# Patient Record
Sex: FEMALE | NOT HISPANIC OR LATINO | Employment: OTHER | URBAN - METROPOLITAN AREA
[De-identification: names, ages, dates, MRNs, and addresses within clinical notes are randomized per-mention and may not be internally consistent; named-entity substitution may affect disease eponyms.]

---

## 2017-01-16 ENCOUNTER — COMMUNICATION - HEALTHEAST (OUTPATIENT)
Dept: CARDIOLOGY | Facility: CLINIC | Age: 47
End: 2017-01-16

## 2017-01-16 DIAGNOSIS — R00.0 TACHYCARDIA: ICD-10-CM

## 2017-01-20 ENCOUNTER — OFFICE VISIT - HEALTHEAST (OUTPATIENT)
Dept: CARDIOLOGY | Facility: CLINIC | Age: 47
End: 2017-01-20

## 2017-01-20 ENCOUNTER — AMBULATORY - HEALTHEAST (OUTPATIENT)
Dept: CARDIOLOGY | Facility: CLINIC | Age: 47
End: 2017-01-20

## 2017-01-20 DIAGNOSIS — R00.2 HEART PALPITATIONS: ICD-10-CM

## 2017-01-20 DIAGNOSIS — I10 ESSENTIAL HYPERTENSION WITH GOAL BLOOD PRESSURE LESS THAN 130/85: ICD-10-CM

## 2017-01-20 ASSESSMENT — MIFFLIN-ST. JEOR: SCORE: 1327.35

## 2017-02-07 ENCOUNTER — COMMUNICATION - HEALTHEAST (OUTPATIENT)
Dept: CARDIOLOGY | Facility: CLINIC | Age: 47
End: 2017-02-07

## 2017-02-15 ENCOUNTER — COMMUNICATION - HEALTHEAST (OUTPATIENT)
Dept: CARDIOLOGY | Facility: CLINIC | Age: 47
End: 2017-02-15

## 2017-02-21 ENCOUNTER — COMMUNICATION - HEALTHEAST (OUTPATIENT)
Dept: CARDIOLOGY | Facility: CLINIC | Age: 47
End: 2017-02-21

## 2017-02-21 DIAGNOSIS — R07.9 CHEST PAIN: ICD-10-CM

## 2017-02-21 DIAGNOSIS — R00.2 PALPITATIONS: ICD-10-CM

## 2017-02-28 ENCOUNTER — HOSPITAL ENCOUNTER (OUTPATIENT)
Dept: CARDIOLOGY | Facility: HOSPITAL | Age: 47
Discharge: HOME | End: 2017-02-28
Attending: INTERNAL MEDICINE

## 2017-02-28 DIAGNOSIS — R00.2 PALPITATIONS: ICD-10-CM

## 2017-02-28 DIAGNOSIS — R07.9 CHEST PAIN: ICD-10-CM

## 2017-03-14 ENCOUNTER — COMMUNICATION - HEALTHEAST (OUTPATIENT)
Dept: CARDIOLOGY | Facility: CLINIC | Age: 47
End: 2017-03-14

## 2017-03-15 ENCOUNTER — AMBULATORY - HEALTHEAST (OUTPATIENT)
Dept: CARDIOLOGY | Facility: CLINIC | Age: 47
End: 2017-03-15

## 2017-03-15 ENCOUNTER — COMMUNICATION - HEALTHEAST (OUTPATIENT)
Dept: CARDIOLOGY | Facility: CLINIC | Age: 47
End: 2017-03-15

## 2017-03-15 DIAGNOSIS — R07.9 CHEST PAIN: ICD-10-CM

## 2017-03-21 ENCOUNTER — AMBULATORY - HEALTHEAST (OUTPATIENT)
Dept: CARDIOLOGY | Facility: CLINIC | Age: 47
End: 2017-03-21

## 2017-03-23 ENCOUNTER — HOSPITAL ENCOUNTER (OUTPATIENT)
Dept: CARDIOLOGY | Facility: HOSPITAL | Age: 47
Discharge: HOME | End: 2017-03-23
Attending: INTERNAL MEDICINE

## 2017-03-23 DIAGNOSIS — R07.9 CHEST PAIN: ICD-10-CM

## 2017-03-24 LAB
CV STRESS CURRENT BP HE: NORMAL
CV STRESS CURRENT HR HE: 109
CV STRESS CURRENT HR HE: 110
CV STRESS CURRENT HR HE: 111
CV STRESS CURRENT HR HE: 113
CV STRESS CURRENT HR HE: 113
CV STRESS CURRENT HR HE: 114
CV STRESS CURRENT HR HE: 115
CV STRESS CURRENT HR HE: 116
CV STRESS CURRENT HR HE: 117
CV STRESS CURRENT HR HE: 139
CV STRESS CURRENT HR HE: 140
CV STRESS CURRENT HR HE: 147
CV STRESS CURRENT HR HE: 154
CV STRESS CURRENT HR HE: 159
CV STRESS CURRENT HR HE: 76
CV STRESS CURRENT HR HE: 81
CV STRESS CURRENT HR HE: 81
CV STRESS CURRENT HR HE: 82
CV STRESS CURRENT HR HE: 84
CV STRESS CURRENT HR HE: 86
CV STRESS CURRENT HR HE: 87
CV STRESS CURRENT HR HE: 87
CV STRESS CURRENT HR HE: 93
CV STRESS CURRENT HR HE: 97
CV STRESS CURRENT HR HE: 98
CV STRESS CURRENT HR HE: 99
CV STRESS CURRENT HR HE: 99
CV STRESS DEVIATION TIME HE: NORMAL
CV STRESS ECHO PERCENT HR HE: NORMAL
CV STRESS EXERCISE STAGE HE: NORMAL
CV STRESS FINAL RESTING BP HE: NORMAL
CV STRESS FINAL RESTING HR HE: 84
CV STRESS MAX HR HE: 159
CV STRESS MAX TREADMILL GRADE HE: 16
CV STRESS MAX TREADMILL SPEED HE: 4.2
CV STRESS PEAK DIA BP HE: NORMAL
CV STRESS PEAK SYS BP HE: NORMAL
CV STRESS PHASE HE: NORMAL
CV STRESS PROTOCOL HE: NORMAL
CV STRESS RESTING PT POSITION HE: NORMAL
CV STRESS RESTING PT POSITION HE: NORMAL
CV STRESS ST DEVIATION AMOUNT HE: NORMAL
CV STRESS ST DEVIATION ELEVATION HE: NORMAL
CV STRESS ST EVELATION AMOUNT HE: NORMAL
CV STRESS TEST TYPE HE: NORMAL
CV STRESS TOTAL STAGE TIME MIN 1 HE: NORMAL
STRESS ECHO BASELINE BP: NORMAL
STRESS ECHO BASELINE HR: 76
STRESS ECHO CALCULATED PERCENT HR: 91 %
STRESS ECHO LAST STRESS BP: NORMAL
STRESS ECHO LAST STRESS HR: 154
STRESS ECHO POST ESTIMATED WORKLOAD: 11.3
STRESS ECHO POST EXERCISE DUR MIN: 9
STRESS ECHO POST EXERCISE DUR SEC: 44
STRESS ECHO TARGET HR: 148

## 2017-04-05 ENCOUNTER — OFFICE VISIT - HEALTHEAST (OUTPATIENT)
Dept: CARDIOLOGY | Facility: CLINIC | Age: 47
End: 2017-04-05

## 2017-04-05 DIAGNOSIS — I10 ESSENTIAL HYPERTENSION WITH GOAL BLOOD PRESSURE LESS THAN 130/85: ICD-10-CM

## 2017-04-05 DIAGNOSIS — R00.2 PALPITATIONS: ICD-10-CM

## 2017-04-05 ASSESSMENT — MIFFLIN-ST. JEOR: SCORE: 1312.61

## 2017-05-18 ENCOUNTER — RECORDS - HEALTHEAST (OUTPATIENT)
Dept: ADMINISTRATIVE | Facility: OTHER | Age: 47
End: 2017-05-18

## 2017-05-23 ENCOUNTER — HOSPITAL ENCOUNTER (OUTPATIENT)
Dept: ULTRASOUND IMAGING | Facility: HOSPITAL | Age: 47
Discharge: HOME | End: 2017-05-23

## 2017-05-23 DIAGNOSIS — R19.00 PELVIC MASS: ICD-10-CM

## 2017-08-26 ENCOUNTER — HEALTH MAINTENANCE LETTER (OUTPATIENT)
Age: 47
End: 2017-08-26

## 2017-09-08 ENCOUNTER — OFFICE VISIT - HEALTHEAST (OUTPATIENT)
Dept: CARDIOLOGY | Facility: CLINIC | Age: 47
End: 2017-09-08

## 2017-09-08 DIAGNOSIS — I10 ESSENTIAL HYPERTENSION WITH GOAL BLOOD PRESSURE LESS THAN 130/85: ICD-10-CM

## 2017-09-08 DIAGNOSIS — R00.2 PALPITATIONS: ICD-10-CM

## 2017-09-08 ASSESSMENT — MIFFLIN-ST. JEOR: SCORE: 1339.83

## 2020-07-02 ENCOUNTER — OFFICE VISIT - HEALTHEAST (OUTPATIENT)
Dept: FAMILY MEDICINE | Facility: CLINIC | Age: 50
End: 2020-07-02

## 2020-07-02 DIAGNOSIS — Z20.822 SUSPECTED 2019 NOVEL CORONAVIRUS INFECTION: ICD-10-CM

## 2020-07-02 DIAGNOSIS — Z76.89 ENCOUNTER TO ESTABLISH CARE: ICD-10-CM

## 2020-07-02 ASSESSMENT — PATIENT HEALTH QUESTIONNAIRE - PHQ9: SUM OF ALL RESPONSES TO PHQ QUESTIONS 1-9: 0

## 2020-07-06 ENCOUNTER — AMBULATORY - HEALTHEAST (OUTPATIENT)
Dept: LAB | Facility: CLINIC | Age: 50
End: 2020-07-06

## 2020-07-06 DIAGNOSIS — Z20.822 SUSPECTED 2019 NOVEL CORONAVIRUS INFECTION: ICD-10-CM

## 2020-07-11 ENCOUNTER — COMMUNICATION - HEALTHEAST (OUTPATIENT)
Dept: FAMILY MEDICINE | Facility: CLINIC | Age: 50
End: 2020-07-11

## 2020-07-30 ENCOUNTER — VIRTUAL VISIT (OUTPATIENT)
Dept: FAMILY MEDICINE | Facility: OTHER | Age: 50
End: 2020-07-30
Payer: COMMERCIAL

## 2020-07-30 PROCEDURE — 99421 OL DIG E/M SVC 5-10 MIN: CPT | Performed by: PHYSICIAN ASSISTANT

## 2020-07-31 NOTE — PROGRESS NOTES
"Date: 2020 14:50:56  Clinician: Nelson Corey  Clinician NPI: [id number]  Patient: Effie Olivo  Patient : 1970  Patient Address: [address] [place] [address]  Patient Phone: [phone]  Visit Protocol: UTI  Patient Summary:  Effie is a 50 year old ( : 1970 ) female who initiated a Visit for a presumed bladder infection. When asked the question \"Please sign me up to receive news, health information and promotions from [employer].\", Effie responded \"No\".   Her symptoms started 1-3 days ago and consist of urinary frequency, urgency, urinary incontinence, dysuria, and feeling as if the bladder is never empty.   Symptom details   Urine color: The color of her urine is yellow.    Denied symptoms include flank pain, vaginal discharge, abdominal pain, chills, vomiting, vaginal itching, foul-smelling urine, and nausea. She does not feel feverish.   Over-the-counter medications or home remedies used to relieve the current symptoms as reported by the patient (free text): AZO   Precipitating events  Effie denies having a sexually transmitted disease.  Pertinent medical history  Effie has had a bladder infection before and has had 2 in the past 12 months. Her most recent bladder infection was not within the last 4 weeks. Her current symptoms are similar to her previous bladder infection symptoms.   Ciprofloxacin (Cipro) and nitrofurantoin (Macrobid) has been effective in treating her past bladder infections.   Effie has not been prescribed antibiotics to prevent frequent or repeated bladder infections in the past and does not get yeast infections when she takes antibiotics. She has not experienced problems or side effects with any of the common antibiotics used to treat bladder infections.   Effie does not have a history of kidney stones. She has not used a catheter or been a patient in a hospital or nursing home in the past 2 weeks.   Effie does not smoke or use smokeless " tobacco.   She denies pregnancy and denies breastfeeding. She is currently menstruating.   Additional information as reported by the patient (free text): a year ago I had a bladder infection that responded to antibiotics but then came back 2-3 days later. it was a 5 day script. OBGYN gave me something stronger after that.     MEDICATIONS: No current medications, ALLERGIES: Opioids - Morphine Analogues  Clinician Response:  Dear Effie,  Based on the information you have provided, you likely have an acute urinary tract infection, also called a bladder infection. Bladder infections occur when bacteria from the outside of the body enters the urinary tract. Any part of the urinary system can be infected, but the bladder is the most common.  Medication information  I am prescribing:     Nitrofurantoin monohyd/m-cryst (Macrobid) 100 mg oral capsule. Take 1 capsule by mouth every 12 hours for 5 days. Take this medication with food. There are no refills with this prescription.   The medication I prescribed for your bladder infection is an antibiotic. Continue taking the medication until it is gone even if you feel better.   Yeast infections can be a common side effect of antibiotics. The most common symptom of a yeast infection is itchiness in and around the vagina. Other signs and symptoms include burning, redness, or a thick, white vaginal discharge that looks like cottage cheese and does not have a bad smell.  Self care  Urination helps to flush bacteria from the urinary tract. For this reason, drinking water and urinating often helps relieve some urinary symptoms and can decrease your risk of getting bladder infections in the future.  Other steps you can take to prevent future bladder infections include:     Wipe front to back after using the bathroom    Urinate after sexual intercourse    Avoid using deodorant sprays, douches, or powders in the vaginal area     When to seek care  Please make an appointment to be seen  in a clinic or urgent care if any of the following occur:     You develop new symptoms or your symptoms become worse    You have medication side effects that make it difficult to take them as prescribed    Your symptoms do not improve within 1-2 days of starting treatment    You have symptoms of a bladder infection that return shortly after completing treatment     It is possible to have an allergic reaction to an antibiotic even if you have not had one in the past. If you notice a new rash, significant swelling, or difficulty breathing, stop taking this medication immediately and go to a clinic or urgent care.   Diagnosis: Acute uncomplicated bladder infection  Diagnosis ICD: N39.0  Prescription: nitrofurantoin monohyd/m-cryst (Macrobid) 100 mg oral capsule 10 capsule, 5 days supply. Take 1 capsule by mouth every 12 hours for 5 days. Refills: 0, Refill as needed: no, Allow substitutions: yes  Pharmacy: [facility] [place] - [phone] - [phone],  [place] [address]

## 2020-10-10 ENCOUNTER — COMMUNICATION - HEALTHEAST (OUTPATIENT)
Dept: FAMILY MEDICINE | Facility: CLINIC | Age: 50
End: 2020-10-10

## 2021-02-17 ENCOUNTER — COMMUNICATION - HEALTHEAST (OUTPATIENT)
Dept: FAMILY MEDICINE | Facility: CLINIC | Age: 51
End: 2021-02-17

## 2021-02-17 DIAGNOSIS — F40.243 FEAR OF FLYING: ICD-10-CM

## 2021-02-24 ENCOUNTER — COMMUNICATION - HEALTHEAST (OUTPATIENT)
Dept: FAMILY MEDICINE | Facility: CLINIC | Age: 51
End: 2021-02-24

## 2021-03-17 ENCOUNTER — OFFICE VISIT - HEALTHEAST (OUTPATIENT)
Dept: FAMILY MEDICINE | Facility: CLINIC | Age: 51
End: 2021-03-17

## 2021-03-17 DIAGNOSIS — Z80.8 FAMILY HISTORY OF THYROID CANCER: ICD-10-CM

## 2021-03-17 DIAGNOSIS — Z12.31 ENCOUNTER FOR SCREENING MAMMOGRAM FOR BREAST CANCER: ICD-10-CM

## 2021-03-17 DIAGNOSIS — Z00.00 ROUTINE GENERAL MEDICAL EXAMINATION AT A HEALTH CARE FACILITY: ICD-10-CM

## 2021-03-17 DIAGNOSIS — Z12.11 SCREEN FOR COLON CANCER: ICD-10-CM

## 2021-03-17 DIAGNOSIS — F51.01 PRIMARY INSOMNIA: ICD-10-CM

## 2021-03-17 LAB
ALBUMIN SERPL-MCNC: 3.8 G/DL (ref 3.5–5)
ALP SERPL-CCNC: 63 U/L (ref 45–120)
ALT SERPL W P-5'-P-CCNC: 11 U/L (ref 0–45)
ANION GAP SERPL CALCULATED.3IONS-SCNC: 9 MMOL/L (ref 5–18)
AST SERPL W P-5'-P-CCNC: 15 U/L (ref 0–40)
BILIRUB SERPL-MCNC: 0.3 MG/DL (ref 0–1)
BUN SERPL-MCNC: 16 MG/DL (ref 8–22)
CALCIUM SERPL-MCNC: 8.7 MG/DL (ref 8.5–10.5)
CHLORIDE BLD-SCNC: 105 MMOL/L (ref 98–107)
CHOLEST SERPL-MCNC: 224 MG/DL
CO2 SERPL-SCNC: 27 MMOL/L (ref 22–31)
CREAT SERPL-MCNC: 0.78 MG/DL (ref 0.6–1.1)
ERYTHROCYTE [DISTWIDTH] IN BLOOD BY AUTOMATED COUNT: 11.7 % (ref 11–14.5)
FASTING STATUS PATIENT QL REPORTED: YES
GFR SERPL CREATININE-BSD FRML MDRD: >60 ML/MIN/1.73M2
GLUCOSE BLD-MCNC: 95 MG/DL (ref 70–125)
HCT VFR BLD AUTO: 38.9 % (ref 35–47)
HDLC SERPL-MCNC: 74 MG/DL
HGB BLD-MCNC: 12.6 G/DL (ref 12–16)
LDLC SERPL CALC-MCNC: 138 MG/DL
MCH RBC QN AUTO: 28.6 PG (ref 27–34)
MCHC RBC AUTO-ENTMCNC: 32.4 G/DL (ref 32–36)
MCV RBC AUTO: 88 FL (ref 80–100)
PLATELET # BLD AUTO: 251 THOU/UL (ref 140–440)
PMV BLD AUTO: 9.7 FL (ref 7–10)
POTASSIUM BLD-SCNC: 4.1 MMOL/L (ref 3.5–5)
PROT SERPL-MCNC: 6.3 G/DL (ref 6–8)
RBC # BLD AUTO: 4.4 MILL/UL (ref 3.8–5.4)
SODIUM SERPL-SCNC: 141 MMOL/L (ref 136–145)
TRIGL SERPL-MCNC: 60 MG/DL
TSH SERPL DL<=0.005 MIU/L-ACNC: 2.21 UIU/ML (ref 0.3–5)
WBC: 7.2 THOU/UL (ref 4–11)

## 2021-03-17 ASSESSMENT — MIFFLIN-ST. JEOR: SCORE: 1356.38

## 2021-05-26 ASSESSMENT — PATIENT HEALTH QUESTIONNAIRE - PHQ9: SUM OF ALL RESPONSES TO PHQ QUESTIONS 1-9: 0

## 2021-05-30 VITALS — HEIGHT: 66 IN | BODY MASS INDEX: 24.75 KG/M2 | WEIGHT: 154 LBS

## 2021-05-30 VITALS — WEIGHT: 149 LBS | HEIGHT: 66 IN | BODY MASS INDEX: 23.95 KG/M2

## 2021-05-31 ENCOUNTER — RECORDS - HEALTHEAST (OUTPATIENT)
Dept: ADMINISTRATIVE | Facility: CLINIC | Age: 51
End: 2021-05-31

## 2021-05-31 VITALS — HEIGHT: 66 IN | WEIGHT: 155 LBS | BODY MASS INDEX: 24.91 KG/M2

## 2021-06-05 VITALS
SYSTOLIC BLOOD PRESSURE: 134 MMHG | HEIGHT: 66 IN | WEIGHT: 160.4 LBS | HEART RATE: 78 BPM | BODY MASS INDEX: 25.78 KG/M2 | RESPIRATION RATE: 16 BRPM | DIASTOLIC BLOOD PRESSURE: 84 MMHG

## 2021-06-07 ENCOUNTER — HOSPITAL ENCOUNTER (OUTPATIENT)
Dept: MAMMOGRAPHY | Facility: CLINIC | Age: 51
Discharge: HOME | End: 2021-06-07
Attending: FAMILY MEDICINE
Payer: COMMERCIAL

## 2021-06-07 DIAGNOSIS — Z12.31 ENCOUNTER FOR SCREENING MAMMOGRAM FOR BREAST CANCER: ICD-10-CM

## 2021-06-07 DIAGNOSIS — Z00.00 ROUTINE GENERAL MEDICAL EXAMINATION AT A HEALTH CARE FACILITY: ICD-10-CM

## 2021-06-08 ENCOUNTER — AMBULATORY - HEALTHEAST (OUTPATIENT)
Dept: NURSING | Facility: CLINIC | Age: 51
End: 2021-06-08

## 2021-06-09 NOTE — PROGRESS NOTES
"Effie Olivo is a 50 y.o. female who is being evaluated via a billable video visit.      The patient has been notified of following:     \"This video visit will be conducted via a call between you and your physician/provider. We have found that certain health care needs can be provided without the need for an in-person physical exam.  This service lets us provide the care you need with a video conversation.  If a prescription is necessary we can send it directly to your pharmacy.  If lab work is needed we can place an order for that and you can then stop by our lab to have the test done at a later time.    Video visits are billed at different rates depending on your insurance coverage. Please reach out to your insurance provider with any questions.    If during the course of the call the physician/provider feels a video visit is not appropriate, you will not be charged for this service.\"    Patient has given verbal consent to a Video visit? Yes  How would you like to obtain your AVS? AVS Preference: Lumiatahart.  Patient would like the video invitation sent by: Text to cell phone: [phone]  Will anyone else be joining your video visit? No      Video Start Time: 10:00    Additional provider notes:   Assessment/ Plan     1. Suspected 2019 novel coronavirus infection  Yasmeen would like to have serology testing.  She had a pretty significant illness in February after being at an international conference in [facility].  She currently has been symptoms.  We will set this up at the [place] office.  He did have a discussion regarding the meaning of positive antibodies not necessarily providing full immunity.  - COVID-19 Virus (Coronavirus) Antibody & Titer Reflex; Future    2. Encounter to establish care  She is also establishing care with me today as she has had an insurance change.  She is up-to-date on healthcare maintenance with Paps and mammograms.  [facility]  She had a routine " physical in November 2019, so would recommend seeing her around November of this year.  Her past medical history is pretty unremarkable.  Her only medication is occasional Lorazepam for a phobia of flying.      Subjective:       Effie Olivo is a 50 y.o. female who presents for establish care and COVID serology testing.  Yasmeen is a new patient to our system.  She has had a change of insurance and needs to establish care with a new provider.  Her only current concern is serology testing.  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  She had been in [place] in February for an international convention.  When she got home, both she and her  became ill.  She had extreme fatigue, cough, and myalgias.  She did not develop a fever.  Her  was ill longer and more significantly than she was.  She is otherwise very healthy.  As far as past medical history, she was evaluated with cardiology around 2016 for possible high blood pressure and some palpitations.  The only did not feel that she had hypertension so I resolved this off her list.  She may have a little bit of whitecoat hypertension as she tends to be a little bit higher when she comes in but she monitors it at home and it has been normal.  She has a phobia of flying and will take an occasional Lorazepam for that.  Far as healthcare maintenance, [facility] and is up-to-date on Pap smears.  She states she is up-to-date on mammogram and immunizations.  She had a physical with blood work in November 2019 at [facility].  Social history: She is .  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  She has 3 children aged 10, 12, and 15.  She is a non-smoker.  Medications: Currently none.  Allergies are reviewed.    Relevant past medical, family, surgical, and social history reviewed with patient, unless noted in HPI, not pertinent for this visit.  Medications were discussed and  reconciled.   Review of Systems   A 12 point comprehensive review of systems was negative except as noted.      No current outpatient medications on file.     No current facility-administered medications for this visit.        Objective:      There were no vitals taken for this visit.      General appearance: alert, appears stated age and cooperative         No results found for this or any previous visit (from the past 168 hour(s)).       This note has been dictated using voice recognition software. Any grammatical or context distortions are unintentional and inherent to the software      Video-Visit Details    Type of service:  Video Visit    Video End Time (time video stopped): 10:18  Originating Location (pt. Location): Home    Distant Location (provider location):  [facility]/OB     Platform used for Video Visit: Flakito Barry MD

## 2021-06-10 ENCOUNTER — RECORDS - HEALTHEAST (OUTPATIENT)
Dept: RADIOLOGY | Facility: CLINIC | Age: 51
End: 2021-06-10

## 2021-06-10 ENCOUNTER — RECORDS - HEALTHEAST (OUTPATIENT)
Dept: ADMINISTRATIVE | Facility: OTHER | Age: 51
End: 2021-06-10

## 2021-06-16 NOTE — PROGRESS NOTES
Assessment/ Plan     1. Routine general medical examination at a health care facility  Yasmeen presents for her annual exam.  [facility] and breast exam.  She will be setting that up soon.  She is due for mammogram and a colonoscopy and now that she is 51.  We will do fasting blood work today.  - Mammo Screening Bilateral; Future  - Comprehensive Metabolic Panel  - HM2(CBC w/o Differential)  - Lipid Cascade  - Thyroid Stimulating Hormone (TSH)    2. Screen for colon cancer  - Ambulatory referral for Colonoscopy    3. Encounter for screening mammogram for breast cancer  - Mammo Screening Bilateral; Future    4. Family history of thyroid cancer  Her mom had thyroid cancer.  We will check a TSH.  - Thyroid Stimulating Hormone (TSH)    5. Primary insomnia  She has struggled with some insomnia and has used trazodone in the past.  She has a prescription at home if she needs it.  She been taking low-dose melatonin I discussed she could increase the dose.  Discussed good sleep hygiene.      Subjective:     Effie Olivo is a 51 y.o. female who presents for an annual exam.  Overall, she states she been doing fairly well.  Things have been obviously stressful with the pandemic.  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  She has 3 children of her own.  She was doing quite a bit of exercise but had an injury several months ago so has been taking things easy.  She is due for Pap and will schedule that with [facility].  Recommend a mammogram and a colonoscopy.  She has been struggling with some sleep.  She states she is a long history of insomnia but this has been worse with the pandemic.  She had used trazodone in the past but she felt like it made her heart rapid.  She is taking somewhere between 1 to 3 mg of melatonin.  I discussed with her she could bump this up to 5 or 10 as needed.    Healthy Habits:   Regular Exercise: No  Sunscreen Use: Yes  Healthy Diet:  Yes  Dental Visits Regularly: Yes  Seat Belt: Yes  Sexually active: Yes  Self Breast Exam Monthly:No  Colonoscopy: No  Lipid Profile: Yes  Glucose Screen: Yes  Prevention of Osteoporosis: Yes  Last Dexa: No        Immunization History   Administered Date(s) Administered     INFLUENZA,SEASONAL QUAD, PF, =/> 6months 2016, 2018, 2019     Influenza, inj, historic,unspecified 10/18/2010, 2015     Tdap 2018     Immunization status: up to date and documented.     Gynecologic History  No LMP recorded (lmp unknown).  Contraception: none  Last Pap: 2017. Results were: normal  Last mammogram: 2017 Results were: normal      OB History   No obstetric history on file.       Current Outpatient Medications   Medication Sig Dispense Refill     LORazepam (ATIVAN) 0.5 MG tablet Take 1 tablet (0.5 mg total) by mouth every 8 (eight) hours as needed for anxiety. 20 tablet 0     No current facility-administered medications for this visit.      Past Medical History:   Diagnosis Date     Anxiety      HTN (hypertension)      Palpitations      Past Surgical History:   Procedure Laterality Date      SECTION       Azithromycin and Morphine  Family History   Problem Relation Age of Onset     Hypertension Mother      Coronary artery disease Father      Hypertension Father      Social History     Socioeconomic History     Marital status:      Spouse name: Not on file     Number of children: Not on file     Years of education: Not on file     Highest education level: Not on file   Occupational History     Occupation: clinical psychologist     Employer: SELF EMPLOYED   Social Needs     Financial resource strain: Not on file     Food insecurity     Worry: Not on file     Inability: Not on file     Transportation needs     Medical: Not on file     Non-medical: Not on file   Tobacco Use     Smoking status: Former Smoker     Types: Cigars     Smokeless tobacco: Never Used     Tobacco comment: hasa cigar about  "once per month   Substance and Sexual Activity     Alcohol use: Yes     Comment: occasional     Drug use: No     Comment: formely used     Sexual activity: Not on file   Lifestyle     Physical activity     Days per week: Not on file     Minutes per session: Not on file     Stress: Not on file   Relationships     Social connections     Talks on phone: Not on file     Gets together: Not on file     Attends Muslim service: Not on file     Active member of club or organization: Not on file     Attends meetings of clubs or organizations: Not on file     Relationship status: Not on file     Intimate partner violence     Fear of current or ex partner: Not on file     Emotionally abused: Not on file     Physically abused: Not on file     Forced sexual activity: Not on file   Other Topics Concern     Not on file   Social History Narrative     Not on file       Review of Systems  General:  Denies problems  Eyes:  Denies problems  Ears/Nose/Throat:  Denies problems  Cardiovascular:  Denies problems  Respiratory:  Denies problems  Gastrointestinal:  Denies problems  Genitourinary:  Denies problems  Musculoskeletal:  Denies problems  Skin:  Denies problems  Neurologic:  Denies problems  Psychiatric:  Denies problems  Endocrine:  Denies problems  Heme/Lymphatic:  Denies problems  Allergic/Immunologic:  Denies problems    Objective:      Vitals:    03/17/21 0759   BP: 134/84   Pulse: 78   Resp: 16   Weight: 160 lb 6.4 oz (72.8 kg)   Height: 5' 5.5\" (1.664 m)       Physical Exam:  General Appearance: Alert, cooperative, no distress, appears stated age  Head: Normocephalic, without obvious abnormality, atraumatic  Eyes: PERRL, conjunctiva/corneas clear, EOM's intact  Ears: Normal TM's and external ear canals, both ears  Nose: Nares normal, septum midline,mucosa normal, no drainage  Throat: Lips, mucosa, and tongue normal; teeth and gums normal  Neck: Supple, symmetrical, trachea midline, no adenopathy; thyroid: not enlarged, " symmetric, no tenderness/mass/nodules; no carotid bruit  Back: Symmetric, no curvature, ROM normal, no CVA tenderness  Lungs: Clear to auscultation bilaterally, respirations unlabored  Heart: Regular rate and rhythm, S1 and S2 normal, no murmur, rub, or gallop, Abdomen: Soft, non-tender, bowel sounds active all four quadrants,  no masses, no organomegaly  Extremities: Extremities normal, atraumatic, no cyanosis or edema  Skin: Skin color, texture, turgor normal, no rashes or lesions  Lymph nodes: Cervical, supraclavicular, and axillary nodes normal  Neurologic: Normal        Results for orders placed or performed in visit on 03/17/21   HM2(CBC w/o Differential)   Result Value Ref Range    WBC 7.2 4.0 - 11.0 thou/uL    RBC 4.40 3.80 - 5.40 mill/uL    Hemoglobin 12.6 12.0 - 16.0 g/dL    Hematocrit 38.9 35.0 - 47.0 %    MCV 88 80 - 100 fL    MCH 28.6 27.0 - 34.0 pg    MCHC 32.4 32.0 - 36.0 g/dL    RDW 11.7 11.0 - 14.5 %    Platelets 251 140 - 440 thou/uL    MPV 9.7 7.0 - 10.0 fL       Rosalee Barry MD    This note has been dictated using voice recognition software. Any grammatical or context distortions are unintentional and inherent to the software

## 2021-06-20 ENCOUNTER — HEALTH MAINTENANCE LETTER (OUTPATIENT)
Age: 51
End: 2021-06-20

## 2021-06-20 NOTE — LETTER
Letter by Bailee Wilson LPN at      Author: Bailee Wilson LPN Service: -- Author Type: --    Filed:  Encounter Date: 7/11/2020 Status: (Other)       7/11/2020        Effie Olivo  [address]  [place] [address]    COVID-19 Antibody Screen   Date Value Ref Range Status   07/06/2020 Negative  Final     Comment:     No COVID-19 antibodies detected.  Patients within 10 days of symptom onset for  COVID-19 may not produce sufficient levels of detectable antibodies.  Immunocompromised COVID-19 patients may take longer to develop antibodies.     COVID-19 IgG Titer   Date Value Ref Range Status   07/06/2020 Not Applicable  Final     Comment:     Qualitative screen for total antibodies to COVID-19 (SARS-CoV-2) with  semi-quantitative measurement of IgG COVID-19 antibodies by endpoint titer.  COVID-19 antibodies may be elevated due to a past or current infection.  Negative results do not rule out COVID-19 infection.  Results from antibody  testing should not be used as the sole basis to diagnose or exclude SARS-CoV-2  infection or to inform infection status.  COVID-19 PCR test should be ordered  if current infection is suspected.  False positive results may occur in rare  cases due to cross-reacting antibodies.  This test was developed and its performance characteristics determined by the  [facility] ([facility]),  which is regulated under CLIA as qualified to perform high-complexity testing.  This test has not been reviewed by the FDA.  Testing performed by [facility], [facility], [address], [address], [place] [address]       No results found for: [id number]    You have tested NEGATIVE for COVID-19 antibodies. This suggests you have not had or been exposed to COVID-19. But it does not mean that for sure.    The test finds antibodies in most people 10 days after they get sick. For some people, it  takes longer than 10 days for antibodies to show up. Others may never show antibodies against COVID-19, especially if they have weak immune systems.    If you have COVID-19 symptoms now, please stay home and away from others.     Your current symptoms may or may not be COVID-19.     What is antibody testing?  This is a kind of blood test. We take a small sample of your blood, and then test it for something called antibodies.   Your body makes antibodies to fight infection. If your blood has antibodies for a certain germ, it means youve been infected with that germ in the past.   Sometimes, antibodies stay in your body for years after youve had the infection. They can be there even if the germ didnt make you sick. They are a sign that your body fought off the infection.  Will this test find antibodies in everyone whos had COVID-19?  No. The test finds antibodies in most people 10 days after they get sick. For some people, it takes longer than 10 days for antibodies to show up. Others may never show antibodies against COVID-19, especially if they have weak immune systems.  What are the signs of COVID-19?  Signs of COVID-19 can appear from 2 to 14 days (up to 2 weeks) after youre infected. Some people have no symptoms or only mild symptoms. Others get very sick. The most common symptoms are:      Cough    Shortness of breath or trouble breathing    Or at least 2 of these symptoms:      Fever    Chills    Repeated shaking with chills    Muscle pain    Headache    Sore throat    Losing your sense of taste or smell    You may have other symptoms. Please contact your doctor or clinic for any symptoms that worry you.    Where can I get more information?     To learn the [facility] guidelines for staying home, please visit the [facility] website at [email or web address]    To learn more about COVID-19 and how to care for yourself at home, please visit the CDC  website at https://www.cdc.gov/coronavirus/2019-ncov/about/steps-when-sick.html    For more options for care at [facility], please visit our website at [email or web address]    [place] [facility] ([facility]) COVID-19 Hotline:  [phone]

## 2021-06-25 NOTE — PROGRESS NOTES
Progress Notes by Clarence Montez DO at 1/20/2017  8:50 AM     Author: Clarence Montez DO Service: -- Author Type: Physician    Filed: 1/20/2017  1:31 PM Encounter Date: 1/20/2017 Status: Signed    : Clarence Montez DO (Physician)           Click to link to [facility] HEART CARE NOTE    Thank you, Dr. Monaco, for asking the [facility] Heart Care team to see Ms. Effie Olivo to evaluate palpitations.      Assessment/Recommendations   Assessment:    1.  Recurrent palpitations.  Patient symptoms particularly occur at night.  Reticulocyte monitor has not demonstrated significant cardiac arrhythmia.  2.  Hypertension  3.  Anxiety    Plan:  1.  Long discussion with patient regarding possible treatment options.  At this point she noted improvement in her symptoms with atenolol but she notes fatigue and depression like symptoms with using this medication.  Her blood pressure has been well controlled with atenolol but she does not want to continue this medication.  Alternative medication would be diltiazem which she is willing to try.  Start diltiazem 120 mg daily.  2.  Long discussion regarding possible sleep apnea.  If ongoing symptoms recommend sleep study patient does have symptoms of daytime fatigue.  Poor sleep quality.  Frequently falls asleep while reading books or TV.   has noted snoring. Will need to consider sleep study.           History of Present Illness    Ms. Effie Olivo is a 47 y.o. female with ongoing history of palpitations and hypertension who follows up in clinic for her palpitation symptoms.  Since initially saw patient in 2016 she has followed up with Dr. Orozco on 2 occasions.  During that time she underwent echocardiogram which demonstrated structurally normal heart.  She also underwent a 10 day cardiac monitor which did not demonstrate any significant cardiac arrhythmias.  She was told to start atenolol 12.5 mg in the p.m.  which did improve her overall palpitation symptoms.   She did discontinue this medication given she feels fatigue and depression-like symptoms with prolonged use more than 5 days.  Blood pressure is elevated when she has discontinued atenolol.   Denies any anginal chest pain, syncope, near syncope, dyspnea on exertion, lower extremity edema, PND or orthopnea symptoms.   He does state that she has very poor sleep quality, sleeps less than 5 hours each day, feels tired throughout the day, falls asleep while watching TV or reading books, she feels like her sleep quality is poor.  Her  has noted that she has snored at times.      Patient will states her job is very stressful.      ECHO:   Summary  Technically difficult examination.Definity contrast utilized  Normal left ventricular size and systolic function.  Left ventricular ejection fraction is visually estimated to be 60-65%.  No significant valve abnormality identified    Cardiac Monitor:   CONCLUSION:  1. Normal 2-week event monitor.  2. Symptoms correlating with sinus rhythm.     Physical Examination Review of Systems   Vitals:    01/20/17 0904   BP: 114/80   Pulse: 72   Resp: 16     Body mass index is 25.24 kg/(m^2).  Wt Readings from Last 3 Encounters:   01/20/17 154 lb (69.9 kg)   01/19/17 154 lb (69.9 kg)   09/01/16 154 lb 3.2 oz (69.9 kg)       General Appearance:   no distress, normal body habitus   ENT/Mouth: membranes moist, no oral lesions or bleeding gums.      EYES:  no scleral icterus, normal conjunctivae   Neck: no carotid bruits or thyromegaly   Chest/Lungs:   lungs are clear to auscultation, no rales or wheezing, no sternal scar, equal chest wall expansion    Cardiovascular:   Regular. Normal first and second heart sounds with no murmurs, rubs, or gallops; the carotid, radial and posterior tibial pulses are intact, Jugular venous pressure normal, no edema bilaterally    Abdomen:  no organomegaly, masses, bruits, or tenderness; bowel sounds  are present   Extremities: no cyanosis or clubbing   Skin: no xanthelasma, warm.    Neurologic: normal gait, normal  bilateral, no tremors     Psychiatric: alert and oriented x3, calm     General: WNL  Eyes: WNL  Ears/Nose/Throat: WNL  Lungs: WNL  Stomach: WNL  Bladder: WNL  Muscle/Joints: Muscle Weakness, Muscle Pain (shoulder, neck, back)  Skin: WNL  Nervous System: Dizziness  Mental Health: Anxiety, Confusion (occasional confusion d/t sleep deprivation)     Blood: WNL     Medical History  Surgical History Family History Social History   Past Medical History   Diagnosis Date   ? Anxiety    ? HTN (hypertension)     Past Surgical History   Procedure Laterality Date   ?  section      Family History   Problem Relation Age of Onset   ? Hypertension Mother    ? Coronary artery disease Father    ? Hypertension Father     Social History     Social History   ? Marital status:      Spouse name: N/A   ? Number of children: N/A   ? 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 Smoking status: Light Tobacco Smoker     Types: Cigars   ? Smokeless tobacco: Never Used      Comment: once every other week   ? Alcohol use Yes      Comment: occasional   ? Drug use: No      Comment: formely used   ? Sexual activity: Not on file     Other Topics Concern   ? Not on file     Social History Narrative          Medications  Allergies   Current Outpatient Prescriptions   Medication Sig Dispense Refill   ? LORazepam (ATIVAN) 0.5 MG tablet as needed. For flying  0   ? diltiazem (CARDIZEM CD) 120 MG 24 hr capsule Take 1 capsule (120 mg total) by mouth daily. 30 capsule 11     No current facility-administered medications for this visit.       Allergies   Allergen Reactions   ? Azithromycin Diarrhea   ? Morphine          Lab Results    Chemistry/lipid CBC Cardiac Enzymes/BNP/TSH/INR   Lab Results   Component Value Date    CREATININE 0.83 2017     BUN 12 01/19/2017    K 3.6 01/19/2017     01/19/2017     01/19/2017    CO2 25 01/19/2017    Lab Results   Component Value Date    WBC 8.2 01/19/2017    HGB 13.8 01/19/2017    HCT 41.3 01/19/2017    MCV 85 01/19/2017     01/19/2017    Lab Results   Component Value Date    CKMB 1 01/19/2017    TROPONINI <0.01 01/19/2017    BNP 21 01/23/2016    TSH 1.15 07/09/2016    INR 1.01 01/19/2017

## 2021-06-25 NOTE — PROGRESS NOTES
Progress Notes by Clarence Montez DO at 4/5/2017  2:10 PM     Author: Clarence Montez DO Service: -- Author Type: Physician    Filed: 4/5/2017  2:58 PM Encounter Date: 4/5/2017 Status: Signed    : Clarence Montez DO (Physician)           Click to link to [facility] Heart Care     [facility] HEART CARE NOTE    Thank you, Dr. Monaco, for asking the [facility] Heart Care team to see Ms. Effie Olivo to evaluate palpitations.      Assessment/Recommendations   Assessment:    1.  Recurrent palpitations noncardiac.  30 day monitor demonstrated 91 triggers of cardiac symptoms without arrhythmia. Normal stress echo.    2.  Hypertension, improved with dietary modifications.   3.  Anxiety disorder    Plan:  1.  No further cardiac testing recommended at this time.   2.  Resume exercise regiment  3.  Would not recommend anti-hypertensive medications at this time.    4.  No further AV blocking agents recommended at this time.        History of Present Illness    Ms. Effie Olivo is a 47 y.o. female with history of palpitations and hypertension who follows up in clinic for her palpitation symptoms.  Since initial consultation patient is undergone 30 day cardiac monitoring which demonstrated no cardiac arrhythmia and patient had frequent episodes of palpitations during monitoring.  Echocardiogram given she had episodes of chest discomfort which were atypical in nature.  Exercise echocardiogram demonstrated normal left ventricular function.  She had no EKG changes.  She was able to go 11.3 METS.   Heart rate had a normal response.  Normal blood pressure response.  He underwent EGD which did not demonstrate significant gastritis or ulceration.  She has stopped her atenolol and diltiazem with improvement in fatigue symptoms.  She is also noted her blood pressures improved with dietary modifications.  Denies any syncopal episodes.  His recent started on trazodone which has resulted in  significant improvement in her sleep quality.      ECHO:   Summary  Technically difficult examination.Definity contrast utilized  Normal left ventricular size and systolic function.  Left ventricular ejection fraction is visually estimated to be 60-65%.  No significant valve abnormality identified    Cardiac Monitor:   CONCLUSION:  1. Normal 2-week event monitor.  2. Symptoms correlating with sinus rhythm.     Physical Examination Review of Systems   Vitals:    17 1418   BP: 130/70   Pulse: 68   Resp: 16     Body mass index is 24.05 kg/(m^2).  Wt Readings from Last 3 Encounters:   17 149 lb (67.6 kg)   17 150 lb (68 kg)   17 154 lb (69.9 kg)       General Appearance:   no distress, normal body habitus   ENT/Mouth: membranes moist, no oral lesions or bleeding gums.      EYES:  no scleral icterus, normal conjunctivae           Cardiovascular:   Regular.  radial and posterior tibial pulses are intact, Jugular venous pressure normal, no edema bilaterally            Skin: no xanthelasma, warm.    Neurologic: normal gait, normal  bilateral, no tremors     Psychiatric: alert and oriented x3, calm     General: WNL  Eyes: WNL  Ears/Nose/Throat: WNL  Lungs: WNL  Stomach: WNL  Bladder: WNL  Muscle/Joints: WNL  Skin: WNL  Nervous System: WNL  Mental Health: WNL     Blood: WNL     Medical History  Surgical History Family History Social History   Past Medical History:   Diagnosis Date   ? Anxiety    ? HTN (hypertension)    ? Palpitations     Past Surgical History:   Procedure Laterality Date   ?  SECTION      Family History   Problem Relation Age of Onset   ? Hypertension Mother    ? Coronary artery disease Father    ? Hypertension Father     Social History     Social History   ? Marital status:      Spouse name: N/A   ? Number of children: N/A   ? 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 Smoking status:  Former Smoker     Types: Cigars   ? Smokeless tobacco: Never Used      Comment: once every other week hasa cigar   ? Alcohol use Yes      Comment: occasional   ? Drug use: No      Comment: formely used   ? Sexual activity: Not on file     Other Topics Concern   ? Not on file     Social History Narrative          Medications  Allergies   Current Outpatient Prescriptions   Medication Sig Dispense Refill   ? LORazepam (ATIVAN) 0.5 MG tablet as needed. For flying  0   ? ranitidine (ZANTAC) 300 MG capsule Take 300 mg by mouth every evening.     ? traZODone (DESYREL) 50 MG tablet Take 25 mg by mouth bedtime as needed for sleep.     ? omeprazole (PRILOSEC) 20 MG capsule Take 20 mg by mouth Daily before breakfast.       No current facility-administered medications for this visit.       Allergies   Allergen Reactions   ? Azithromycin Diarrhea   ? Morphine          Lab Results    Chemistry/lipid CBC Cardiac Enzymes/BNP/TSH/INR   Lab Results   Component Value Date    CREATININE 0.83 01/19/2017    BUN 12 01/19/2017    K 3.6 01/19/2017     01/19/2017     01/19/2017    CO2 25 01/19/2017    Lab Results   Component Value Date    WBC 8.2 01/19/2017    HGB 13.8 01/19/2017    HCT 41.3 01/19/2017    MCV 85 01/19/2017     01/19/2017    Lab Results   Component Value Date    CKMB 1 01/19/2017    TROPONINI <0.01 02/21/2017    BNP 21 01/23/2016    TSH 1.15 07/09/2016    INR 1.01 01/19/2017

## 2021-06-25 NOTE — PROGRESS NOTES
Progress Notes by Clarence Montez DO at 9/8/2017  9:50 AM     Author: Clarence Montez DO Service: -- Author Type: Physician    Filed: 9/8/2017  3:24 PM Encounter Date: 9/8/2017 Status: Signed    : Clarence Montez DO (Physician)           Click to link to [facility] Heart [facility] HEART CARE NOTE    Thank you, Dr. Monaco, for asking the [facility] Heart Care team to see Ms. Effie Olivo to evaluate palpitations.      Assessment/Recommendations   Assessment:    1.  Recurrent palpitations, likely non-cardiac.   30 day monitor demonstrated 91 triggers of cardiac symptoms without arrhythmia.  Prior 30 day monitor demonstrated similar findings.  Normal stress echo.      2.  Hypertension  3.  Anxiety disorder  4. Paraesthesias.  Noted at night with lying on left side.  Likely related to transient nerve compression.       Plan:  1.  No further cardiac testing recommended at this time.  If ongoing PM symptoms could consider 24 hour holter.     2.  I discussed with the patient my opinion is that her symptoms are noncardiac.  She does have MRI pending to assess for spinal nerve compression.  3. Follow-up as needed.        History of Present Illness    Ms. Effie Olivo is a 47 y.o. female with history of palpitations and hypertension who follows up in clinic for her palpitation symptoms.      Patient has undergone two separate 30 day cardiac monitoring which demonstrated no cardiac arrhythmia and patient had frequent episodes of palpitations during monitoring.  Exercise echocardiogram demonstrated normal left ventricular function, no EKG changes, normal exercise capacity at 11.3 METS, normal heart rate response.     Since last visit patient has noted episodes of tingling in her arm and chest after lying on her left side for prolonged period of time.  Symptoms resolved after a few minutes and likely related to transient nerve compression.  She also states during these  episodes she monitors her heart rates which demonstrate a heart rate between 60 and 80 bpm and she feels a sensation of palpitations.  She does not have any lightheadedness or syncope.  She has a plan to undergo MRI to assess first cervical spine issues.    ECHO:   Summary  Technically difficult examination.Definity contrast utilized  Normal left ventricular size and systolic function.  Left ventricular ejection fraction is visually estimated to be 60-65%.  No significant valve abnormality identified    Cardiac Monitor:   CONCLUSION:  1. Normal 2-week event monitor.  2. Symptoms correlating with sinus rhythm.     Physical Examination Review of Systems   Vitals:    17 0955   BP: 132/84   Pulse: 72   Resp: 16     Body mass index is 25.02 kg/(m^2).  Wt Readings from Last 3 Encounters:   17 155 lb (70.3 kg)   17 149 lb (67.6 kg)   17 150 lb (68 kg)       General Appearance:   no distress, normal body habitus   ENT/Mouth: membranes moist, no oral lesions or bleeding gums.      EYES:  no scleral icterus, normal conjunctivae           Cardiovascular:   Regular.  radial and posterior tibial pulses are intact, Jugular venous pressure normal, no edema bilaterally. No change.            Skin: no xanthelasma, warm.    Neurologic: normal gait, normal  bilateral, no tremors     Psychiatric: alert and oriented x3, calm     General: WNL  Eyes: WNL  Ears/Nose/Throat: WNL  Lungs: WNL  Stomach: WNL  Bladder: WNL  Muscle/Joints: Joint Pain, Muscle Pain  Skin: WNL  Nervous System: Dizziness  Mental Health: Anxiety     Blood: WNL     Medical History  Surgical History Family History Social History   Past Medical History:   Diagnosis Date   ? Anxiety    ? HTN (hypertension)    ? Palpitations     Past Surgical History:   Procedure Laterality Date   ?  SECTION      Family History   Problem Relation Age of Onset   ? Hypertension Mother    ? Coronary artery disease Father    ? Hypertension Father     Social  History     Social History   ? Marital status:      Spouse name: N/A   ? Number of children: N/A   ? 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 Smoking status: Light Tobacco Smoker     Types: Cigars   ? Smokeless tobacco: Never Used      Comment: hasa cigar about once per month   ? Alcohol use Yes      Comment: occasional   ? Drug use: No      Comment: formely used   ? Sexual activity: Not on file     Other Topics Concern   ? Not on file     Social History Narrative          Medications  Allergies   Current Outpatient Prescriptions   Medication Sig Dispense Refill   ? LORazepam (ATIVAN) 0.5 MG tablet as needed. For flying  0   ? MULTIVIT-MINERALS/FERROUS FUM (MULTI VITAMIN ORAL) Take 1 tablet by mouth daily.     ? omeprazole (PRILOSEC) 20 MG capsule Take 20 mg by mouth Daily before breakfast.     ? ranitidine (ZANTAC) 300 MG capsule Take 300 mg by mouth every evening.     ? traZODone (DESYREL) 50 MG tablet Take 25 mg by mouth bedtime as needed for sleep.       No current facility-administered medications for this visit.       Allergies   Allergen Reactions   ? Azithromycin Diarrhea   ? Morphine          Lab Results    Chemistry/lipid CBC Cardiac Enzymes/BNP/TSH/INR   Lab Results   Component Value Date    CREATININE 0.83 01/19/2017    BUN 12 01/19/2017    K 3.6 01/19/2017     01/19/2017     01/19/2017    CO2 25 01/19/2017    Lab Results   Component Value Date    WBC 8.2 01/19/2017    HGB 13.8 01/19/2017    HCT 41.3 01/19/2017    MCV 85 01/19/2017     01/19/2017    Lab Results   Component Value Date    CKMB 1 01/19/2017    TROPONINI <0.01 02/21/2017    BNP 21 01/23/2016    TSH 1.15 07/09/2016    INR 1.01 01/19/2017

## 2021-06-28 ENCOUNTER — COMMUNICATION - HEALTHEAST (OUTPATIENT)
Dept: FAMILY MEDICINE | Facility: CLINIC | Age: 51
End: 2021-06-28

## 2021-06-28 DIAGNOSIS — F40.243 FEAR OF FLYING: ICD-10-CM

## 2021-06-29 ENCOUNTER — AMBULATORY - HEALTHEAST (OUTPATIENT)
Dept: NURSING | Facility: CLINIC | Age: 51
End: 2021-06-29

## 2021-06-29 DIAGNOSIS — Z11.59 ENCOUNTER FOR SCREENING FOR OTHER VIRAL DISEASES: ICD-10-CM

## 2021-07-14 PROBLEM — I10 ESSENTIAL HYPERTENSION: Status: RESOLVED | Noted: 2017-01-20 | Resolved: 2020-07-02

## 2021-07-15 RX ORDER — LORAZEPAM 0.5 MG/1
0.5 TABLET ORAL EVERY 6 HOURS PRN
COMMUNITY
End: 2022-01-27

## 2021-07-15 ASSESSMENT — MIFFLIN-ST. JEOR: SCORE: 1345.49

## 2021-07-16 ENCOUNTER — OFFICE VISIT (OUTPATIENT)
Dept: FAMILY MEDICINE | Facility: CLINIC | Age: 51
End: 2021-07-16
Payer: COMMERCIAL

## 2021-07-16 ENCOUNTER — ANESTHESIA EVENT (OUTPATIENT)
Dept: SURGERY | Facility: AMBULATORY SURGERY CENTER | Age: 51
End: 2021-07-16
Payer: COMMERCIAL

## 2021-07-16 VITALS
BODY MASS INDEX: 25.78 KG/M2 | RESPIRATION RATE: 16 BRPM | SYSTOLIC BLOOD PRESSURE: 140 MMHG | HEART RATE: 70 BPM | DIASTOLIC BLOOD PRESSURE: 87 MMHG | HEIGHT: 66 IN | WEIGHT: 160.4 LBS

## 2021-07-16 DIAGNOSIS — Z11.4 SCREENING FOR HIV (HUMAN IMMUNODEFICIENCY VIRUS): ICD-10-CM

## 2021-07-16 DIAGNOSIS — Z01.818 PREOP GENERAL PHYSICAL EXAM: Primary | ICD-10-CM

## 2021-07-16 DIAGNOSIS — Z11.59 ENCOUNTER FOR HEPATITIS C SCREENING TEST FOR LOW RISK PATIENT: ICD-10-CM

## 2021-07-16 DIAGNOSIS — Z11.59 ENCOUNTER FOR SCREENING FOR OTHER VIRAL DISEASES: ICD-10-CM

## 2021-07-16 DIAGNOSIS — D25.9 UTERINE LEIOMYOMA, UNSPECIFIED LOCATION: ICD-10-CM

## 2021-07-16 LAB
ALBUMIN SERPL-MCNC: 4 G/DL (ref 3.5–5)
ALP SERPL-CCNC: 64 U/L (ref 45–120)
ALT SERPL W P-5'-P-CCNC: 11 U/L (ref 0–45)
ANION GAP SERPL CALCULATED.3IONS-SCNC: 13 MMOL/L (ref 5–18)
AST SERPL W P-5'-P-CCNC: 14 U/L (ref 0–40)
BASOPHILS # BLD AUTO: 0.1 10E3/UL (ref 0–0.2)
BASOPHILS NFR BLD AUTO: 1 %
BILIRUB SERPL-MCNC: 0.8 MG/DL (ref 0–1)
BUN SERPL-MCNC: 13 MG/DL (ref 8–22)
CALCIUM SERPL-MCNC: 9.1 MG/DL (ref 8.5–10.5)
CHLORIDE BLD-SCNC: 104 MMOL/L (ref 98–107)
CO2 SERPL-SCNC: 24 MMOL/L (ref 22–31)
CREAT SERPL-MCNC: 0.77 MG/DL (ref 0.6–1.1)
EOSINOPHIL # BLD AUTO: 0.3 10E3/UL (ref 0–0.7)
EOSINOPHIL NFR BLD AUTO: 5 %
ERYTHROCYTE [DISTWIDTH] IN BLOOD BY AUTOMATED COUNT: 11.4 % (ref 10–15)
GFR SERPL CREATININE-BSD FRML MDRD: 90 ML/MIN/1.73M2
GLUCOSE BLD-MCNC: 94 MG/DL (ref 70–125)
HCG UR QL: NEGATIVE
HCT VFR BLD AUTO: 39.3 % (ref 35–47)
HGB BLD-MCNC: 12.5 G/DL (ref 11.7–15.7)
HIV 1+2 AB+HIV1 P24 AG SERPL QL IA: NEGATIVE
IMM GRANULOCYTES # BLD: 0 10E3/UL
IMM GRANULOCYTES NFR BLD: 0 %
LYMPHOCYTES # BLD AUTO: 1.3 10E3/UL (ref 0.8–5.3)
LYMPHOCYTES NFR BLD AUTO: 23 %
MCH RBC QN AUTO: 28.3 PG (ref 26.5–33)
MCHC RBC AUTO-ENTMCNC: 31.8 G/DL (ref 31.5–36.5)
MCV RBC AUTO: 89 FL (ref 78–100)
MONOCYTES # BLD AUTO: 0.6 10E3/UL (ref 0–1.3)
MONOCYTES NFR BLD AUTO: 10 %
NEUTROPHILS # BLD AUTO: 3.5 10E3/UL (ref 1.6–8.3)
NEUTROPHILS NFR BLD AUTO: 61 %
PLATELET # BLD AUTO: 225 10E3/UL (ref 150–450)
POTASSIUM BLD-SCNC: 4 MMOL/L (ref 3.5–5)
PROT SERPL-MCNC: 6.6 G/DL (ref 6–8)
RBC # BLD AUTO: 4.41 10E6/UL (ref 3.8–5.2)
SODIUM SERPL-SCNC: 141 MMOL/L (ref 136–145)
WBC # BLD AUTO: 5.7 10E3/UL (ref 4–11)

## 2021-07-16 PROCEDURE — 81025 URINE PREGNANCY TEST: CPT | Performed by: FAMILY MEDICINE

## 2021-07-16 PROCEDURE — U0003 INFECTIOUS AGENT DETECTION BY NUCLEIC ACID (DNA OR RNA); SEVERE ACUTE RESPIRATORY SYNDROME CORONAVIRUS 2 (SARS-COV-2) (CORONAVIRUS DISEASE [COVID-19]), AMPLIFIED PROBE TECHNIQUE, MAKING USE OF HIGH THROUGHPUT TECHNOLOGIES AS DESCRIBED BY CMS-2020-01-R: HCPCS | Performed by: FAMILY MEDICINE

## 2021-07-16 PROCEDURE — 87389 HIV-1 AG W/HIV-1&-2 AB AG IA: CPT | Performed by: FAMILY MEDICINE

## 2021-07-16 PROCEDURE — 36415 COLL VENOUS BLD VENIPUNCTURE: CPT | Performed by: FAMILY MEDICINE

## 2021-07-16 PROCEDURE — 80053 COMPREHEN METABOLIC PANEL: CPT | Performed by: FAMILY MEDICINE

## 2021-07-16 PROCEDURE — 86803 HEPATITIS C AB TEST: CPT | Performed by: FAMILY MEDICINE

## 2021-07-16 PROCEDURE — 99214 OFFICE O/P EST MOD 30 MIN: CPT | Performed by: FAMILY MEDICINE

## 2021-07-16 PROCEDURE — 85025 COMPLETE CBC W/AUTO DIFF WBC: CPT | Performed by: FAMILY MEDICINE

## 2021-07-16 PROCEDURE — U0005 INFEC AGEN DETEC AMPLI PROBE: HCPCS | Performed by: FAMILY MEDICINE

## 2021-07-16 RX ORDER — LIDOCAINE 40 MG/G
CREAM TOPICAL
Status: DISCONTINUED | OUTPATIENT
Start: 2021-07-16 | End: 2021-07-20 | Stop reason: HOSPADM

## 2021-07-16 ASSESSMENT — MIFFLIN-ST. JEOR: SCORE: 1360.35

## 2021-07-16 NOTE — PROGRESS NOTES
[facility]  [address] [facility] [address]  Phone: [phone]  Fax: [phone]  Primary Provider: Rosalee Wong  Pre-op Performing Provider: ELINOR CHARLES      PREOPERATIVE EVALUATION:  Today's date: 7/16/2021    Effie Olivo is a 51 year old female who presents for a preoperative evaluation.    Surgical Information:  Surgery/Procedure: HYSTEROSCOPY, WITH DILATION AND CURETTAGE, POSSIBLE POLYPECTOMY  Surgery Location: [facility] [place]  Surgeon: Dr. Rossy Christy  Surgery Date: 07/19/21  Time of Surgery: 0830  Where patient plans to recover: At home with family  Fax number for surgical facility: Note does not need to be faxed, will be available electronically in Epic.    Type of Anesthesia Anticipated: General    Assessment & Plan     The proposed surgical procedure is considered INTERMEDIATE risk.    Preop general physical exam  Uterine leiomyoma, unspecified location  Cleared for surgery. No underlying cardiovascular pulmonary consult. Otherwise healthy    - CBC with platelets and differential  - Comprehensive metabolic panel (BMP + Alb, Alk Phos, ALT, AST, Total. Bili, TP)  - CBC with platelets and differential  - Comprehensive metabolic panel (BMP + Alb, Alk Phos, ALT, AST, Total. Bili, TP)    Screening for HIV (human immunodeficiency virus)  - HIV Antigen Antibody Combo  - HIV Antigen Antibody Combo    Encounter for hepatitis C screening test for low risk patient  - Hepatitis C antibody  - Hepatitis C antibody    Encounter for screening for other viral diseases  - Asymptomatic COVID-19 Virus (Coronavirus) by PCR Nasopharyngeal       Risks and Recommendations:  The patient has the following additional risks and recommendations for perioperative complications:   - No identified additional risk factors other than previously addressed    Medication Instructions:  Patient is to take all scheduled medications on the day of surgery  except ibuprofen and  multivitamin for 5 days prior to surgery    RECOMMENDATION:   APPROVAL GIVEN to proceed with proposed procedure, without further diagnostic evaluation.      Subjective     HPI related to upcoming procedure:   Chief Complaint   Patient presents with     Pre-Op Exam       Preop Questions 7/16/2021   1. Have you ever had a heart attack or stroke? No   2. Have you ever had surgery on your heart or blood vessels, such as a stent placement, a coronary artery bypass, or surgery on an artery in your head, neck, heart, or legs? No   3. Do you have chest pain with activity? No   4. Do you have a history of  heart failure? No   5. Do you currently have a cold, bronchitis or symptoms of other infection? No   6. Do you have a cough, shortness of breath, or wheezing? No   7. Do you or anyone in your family have previous history of blood clots? No   8. Do you or does anyone in your family have a serious bleeding problem such as prolonged bleeding following surgeries or cuts? No   9. Have you ever had problems with anemia or been told to take iron pills? YES - post-partum. Doing well.    10. Have you had any abnormal blood loss such as black, tarry or bloody stools, or abnormal vaginal bleeding? YES - abnormal vaginal bleeding - heavy periods. Hence D&C.    11. Have you ever had a blood transfusion? No   12. Are you willing to have a blood transfusion if it is medically needed before, during, or after your surgery? Yes   13. Have you or any of your relatives ever had problems with anesthesia? No   14. Do you have sleep apnea, excessive snoring or daytime drowsiness? No   15. Do you have any artifical heart valves or other implanted medical devices like a pacemaker, defibrillator, or continuous glucose monitor? No   16. Do you have artificial joints? No   17. Are you allergic to latex? No   18. Is there any chance that you may be pregnant? No       Health Care Directive:  Patient does not have a Health Care Directive or Living Will:  Discussed advance care planning with patient; information given to patient to review.    Preoperative Review of :   reviewed - controlled substances reflected in medication list.      Status of Chronic Conditions:  See problem list for active medical problems.  Problems all longstanding and stable, except as noted/documented.  See ROS for pertinent symptoms related to these conditions.      Review of Systems  CONSTITUTIONAL: NEGATIVE for fever, chills, change in weight  INTEGUMENTARY/SKIN: NEGATIVE for worrisome rashes, moles or lesions  EYES: NEGATIVE for vision changes or irritation  ENT/MOUTH: NEGATIVE for ear, mouth and throat problems  RESP: NEGATIVE for significant cough or SOB  CV: NEGATIVE for chest pain, palpitations or peripheral edema  GI: NEGATIVE for nausea, abdominal pain, heartburn, or change in bowel habits  : NEGATIVE for frequency, dysuria, or hematuria  MUSCULOSKELETAL: NEGATIVE for significant arthralgias or myalgia  NEURO: NEGATIVE for weakness, dizziness or paresthesias  ENDOCRINE: NEGATIVE for temperature intolerance, skin/hair changes  HEME: NEGATIVE for bleeding problems  PSYCHIATRIC: NEGATIVE for changes in mood or affect    Patient Active Problem List    Diagnosis Date Noted     Allergic rhinitis 10/04/2006     Priority: Medium     Problem list name updated by automated process. Provider to review       Leiomyoma of uterus 07/28/2006     Priority: Medium     Problem list name updated by automated process. Provider to review       Backache 06/01/2006     Priority: Medium     Problem list name updated by automated process. Provider to review       Insomnia 06/14/2005     Priority: Medium     Problem list name updated by automated process. Provider to review       Other specified anemias 06/14/2005     Priority: Medium      Past Medical History:   Diagnosis Date     Anxiety      Chronic peptic ulcer, unspecified site, without mention of hemorrhage, perforation, or obstruction     Saw  "GI who did not want to scope--thought due to ibuprofen     HTN (hypertension)      MEDICAL HISTORY OF -     back injury     MEDICAL HISTORY OF -     congenital fusion of L5-S1     Motion sickness      Other specified anemias     Hgb 10  in 2005     Palpitations      Past Surgical History:   Procedure Laterality Date     C BREAST SURGERY PROCEDURE UNLISTED      Breast reduction     C  DELIVERY ONLY      , Low Cervical      SECTION       MAMMOPLASTY REDUCTION Bilateral      Current Outpatient Medications   Medication Sig Dispense Refill     Calcium Carbonate-Vitamin D (CALCIUM 500 + D PO) Calcium 500       LORazepam (ATIVAN) 0.5 MG tablet Take 0.5 mg by mouth every 6 hours as needed for anxiety (flying)        Multiple Vitamin (MULTIVITAMIN ADULT PO)        MIRCETTE 0.15-0.02/0.01 MG () OR TABS 1 TABLET DAILY (Patient not taking: No sig reported) 28 0       Allergies   Allergen Reactions     Percocet [Oxycodone-Acetaminophen] Nausea and Vomiting     Azithromycin Diarrhea     Hydrocodone Other (See Comments)     Feels Highty Anxious      Morphine Hcl Nausea and Vomiting and Hives        Social History     Tobacco Use     Smoking status: Former Smoker     Types: Cigars     Smokeless tobacco: Never Used     Tobacco comment: hasa cigar about once per month   Substance Use Topics     Alcohol use: Yes     Comment: Alcoholic Drinks/day: occasional     Family History   Problem Relation Age of Onset     Thyroid Disease Mother         cousins and great-aunts on her side     Diabetes Maternal Aunt         30's     Diabetes Maternal Aunt         30's     Hypertension Mother      Coronary Artery Disease Father      Hypertension Father      History   Drug Use No     Comment: Drug use: formely used         Objective     BP (!) 140/87 (BP Location: Left arm, Patient Position: Freeman, Cuff Size: Adult Regular)   Pulse 70   Resp 16   Ht 1.67 m (5' 5.75\")   Wt 72.8 kg (160 lb 6.4 oz)   BMI 26.09 " kg/m      Physical Exam    GENERAL APPEARANCE: healthy, alert and no distress     EYES: EOMI, PERRL     HENT: ear canals and TM's normal and nose and mouth without ulcers or lesions     NECK: no adenopathy, no asymmetry, masses, or scars and thyroid normal to palpation     RESP: lungs clear to auscultation - no rales, rhonchi or wheezes     CV: regular rates and rhythm, normal S1 S2, no S3 or S4 and no murmur, click or rub     ABDOMEN:  soft, nontender, no HSM or masses and bowel sounds normal     MS: extremities normal- no gross deformities noted, no evidence of inflammation in joints, FROM in all extremities.     SKIN: no suspicious lesions or rashes     NEURO: Normal strength and tone, sensory exam grossly normal, mentation intact and speech normal     PSYCH: mentation appears normal. and affect normal/bright     LYMPHATICS: No cervical adenopathy    Recent Labs   Lab Test 03/17/21  0844   HGB 12.6         POTASSIUM 4.1   CR 0.78        Diagnostics:  Labs pending at this time.  Results will be reviewed when available.  Recent Results (from the past 168 hour(s))   CBC with platelets and differential    Collection Time: 07/16/21 11:59 AM   Result Value Ref Range    WBC Count 5.7 4.0 - 11.0 10e3/uL    RBC Count 4.41 3.80 - 5.20 10e6/uL    Hemoglobin 12.5 11.7 - 15.7 g/dL    Hematocrit 39.3 35.0 - 47.0 %    MCV 89 78 - 100 fL    MCH 28.3 26.5 - 33.0 pg    MCHC 31.8 31.5 - 36.5 g/dL    RDW 11.4 10.0 - 15.0 %    Platelet Count 225 150 - 450 10e3/uL    % Neutrophils 61 %    % Lymphocytes 23 %    % Monocytes 10 %    % Eosinophils 5 %    % Basophils 1 %    % Immature Granulocytes 0 %    Absolute Neutrophils 3.5 1.6 - 8.3 10e3/uL    Absolute Lymphocytes 1.3 0.8 - 5.3 10e3/uL    Absolute Monocytes 0.6 0.0 - 1.3 10e3/uL    Absolute Eosinophils 0.3 0.0 - 0.7 10e3/uL    Absolute Basophils 0.1 0.0 - 0.2 10e3/uL    Absolute Immature Granulocytes 0.0 <=0.0 10e3/uL      No EKG required, no history of coronary heart  disease, significant arrhythmia, peripheral arterial disease or other structural heart disease.    Revised Cardiac Risk Index (RCRI):  The patient has the following serious cardiovascular risks for perioperative complications:   - No serious cardiac risks = 0 points     RCRI Interpretation: 0 points: Class I (very low risk - 0.4% complication rate)           Signed Electronically by: Marisol Solo MD  Copy of this evaluation report is provided to requesting physician.

## 2021-07-16 NOTE — PATIENT INSTRUCTIONS
Preparing for Your Surgery  Getting started  A nurse will call you to review your health history and instructions. They will give you an arrival time based on your scheduled surgery time.  Please be ready to share the following:    Your doctor's clinic name and phone number    Your medical, surgical and anesthesia history    A list of allergies and sensitivities    A list of medicines, including herbal treatments and over-the-counter drugs    Whether the patient has a legal guardian (ask how to send us the papers in advance)  If you have a child who's having surgery, please ask for a copy of Preparing for Your Child's Surgery.    Preparing for surgery    Within 30 days of surgery: Have a pre-op exam (sometimes called an H&P, or History and Physical). This can be done at a clinic or pre-operative center.  ? If you're having a , you may not need this exam. Talk to your care team    At your pre-op exam, talk to your care team about all medicines you take. If you need to stop any medicines before surgery, ask when to start taking them again.  ? We do this for your safety. Many medicines can make you bleed too much during surgery. Some change how well surgery (anesthesia) drugs work.    Call your insurance company to let them know you're having surgery. (If you don't have insurance, call [phone].)    Call your clinic if there's any change in your health. This includes signs of a cold or flu (sore throat, runny nose, cough, rash, fever). It also includes a scrape or scratch near the surgery site.    If you have questions on the day of surgery, call your hospital or surgery center.  Eating and drinking guidelines  For your safety: Unless your surgeon tells you otherwise, follow the guidelines below.    Eat and drink as usual until 8 hours before surgery. After that, no food or milk.    Drink clear liquids until 2 hours before surgery. These are liquids you can see through, like water, Gatorade and Propel  Water. You may also have black coffee and tea (no cream or milk).    Nothing by mouth within 2 hours of surgery. This includes gum, candy and breath mints.    If you drink, stop drinking alcohol the night before surgery.    If your care team tells you to take medicine on the morning of surgery, it's okay to take it with a sip of water.  Preventing infection    Shower or bathe the night before and morning of your surgery. Follow the instructions your clinic gave you. (If no instructions, use regular soap.)    Don't shave or clip hair near your surgery site. We'll remove the hair if needed.    Don't smoke or vape the morning of surgery. You may chew nicotine gum up to 2 hours before surgery. A nicotine patch is okay.  ? Note: Some surgeries require you to completely quit smoking and nicotine. Check with your surgeon.    Your care team will make every effort to keep you safe from infection. We will:  ? Clean our hands often with soap and water (or an alcohol-based hand rub).  ? Clean the skin at your surgery site with a special soap that kills germs.  ? Give you a special gown to keep you warm. (Cold raises the risk of infection.)  ? Wear special hair covers, masks, gowns and gloves during surgery.  ? Give antibiotic medicine, if prescribed. Not all surgeries need antibiotics.  What to bring on the day of surgery    Photo ID and insurance card    Copy of your health care directive, if you have one    Glasses and hearing aides (bring cases)  ? You can't wear contacts during surgery    Inhaler and eye drops, if you use them (tell us about these when you arrive)    CPAP machine or breathing device, if you use them    A few personal items, if spending the night    If you have . . .  ? A pacemaker or ICD (cardiac defibrillator): Bring the ID card.  ? An implanted stimulator: Bring the remote control.  ? A legal guardian: Bring a copy of the certified (court-stamped) guardianship papers.  Please remove any jewelry, including  body piercings. Leave jewelry and other valuables at home.  If you're going home the day of surgery  Important: If you don't follow the rules below, we must cancel your surgery.     Arrange for someone to drive you home after surgery. You may not drive, take a taxi or take public transportation by yourself (unless you'll have local anesthesia only).    Arrange for a responsible adult to stay with you overnight. If you don't, we may keep you in the hospital overnight, and you may need to pay the costs yourself.  Questions?   If you have any questions for your care team, list them here: _________________________________________________________________________________________________________________________________________________________________________________________________________________________________________________________________________________________________________________________  For informational purposes only. Not to replace the advice of your health care provider. Copyright   2003, 2019 [employer]. All rights reserved. Clinically reviewed by Margarita Ozuna MD. [employer] 440000 - REV 4/20.    Before Your Procedure or Hospital Admission  Testing for COVID-19 (Coronavirus)  Thank you for choosing [facility] for your health care needs. This is a very challenging time for everyone. The World Health Organization and the State [facility] have declared the COVID-19 virus a pandemic.   Our goal is to keep you and our team here at [facility] safe and healthy. We've taken several steps to make this happen. For example:    We screen our staff, care teams and patients for COVID-19.    Everyone at [facility] must wear a mask and stay 6 feet apart.    We are limiting hospital and clinic visitors.  Before you come in  All patients must get tested for COVID-19. Your test needs to happen 2 to 4 days before you check in to the hospital or surgery site.   A clinic scheduler will call  "about a week in advance to set up a testing time at one of our labs where we'll take a swab of your nose or throat.  Note: If you go to a clinic or pharmacy like [facility] or [facility] for your test, make sure it's a \"RT-PCR\" test, not a \"rapid\" COVID-19 test. (See Questions and Answers below.)  After the test, please stay at home and stay out of contact with other people. It will be harder for you to recover if you get COVID-19 before your treatment.  Please follow all current safety guidelines, including:    Limit trips outside your home.    Limit the number of people you see.    Always wear a mask outside your home.    Use social distancing. (Stay 6 feet away from others whenever you can.)    Wash your hands often.  If your test shows you have COVID-19  If your test is positive, we'll let you know. A positive test means that you have the virus.   We'll probably have to postpone your admission, surgery or procedure. Your doctor will discuss this with you. After that, we'll let you know what to do and when you can reschedule.   We may need to cancel your treatment on short notice for other reasons, too.  If your test shows you DON'T have COVID-19  Even if your test is negative, you may still get COVID-19. It's rare but, sometimes, the test result is wrong. You could also catch the virus after taking the test.   There's a very small chance that you could catch COVID-19 in the hospital or surgery center. [facility] has taken many steps to prevent this from happening.   Day of your surgery or procedure    Please come wearing a mask or something else that covers both your nose and mouth.    When you arrive, we'll ask you some questions to find out if you have any signs or symptoms of COVID-19.    Ask your care team if you can have visitors. All visitors must wear masks and will be screened for signs of COVID-19.  ? Even if no visitors are allowed, you can still have with you:    Your legal guardian or legal decision " "maker    A parent and one other visitor, if you are younger than 18 years old    A partner and a , if you are in labor  ? We might need to teach you about taking care of yourself after surgery. If so, a visitor can come into the hospital to learn about it, too.  ? The rules for visitors change often, depending on how much the virus is spreading. To learn more, see Visiting a Loved One in the Hospital during the COVID-19 Outbreak.  Please call your care team, hospital or surgery center if you have any questions. We thank you for your understanding and for choosing [facility] for your care.   Questions and Answers  Does it matter where I get tested for COVID-19?  Yes. We urge you to get tested at one of our [facility] COVID-19 testing sites. We process these tests in our lab and can get the results quickly. Your [facility] care team needs to get your results before you check in.  What should I do if I can't get tested at [facility]?  You can get tested somewhere else, but you'll need to take these extra steps:  1. Contact your family doctor or clinic to arrange your test.  2. Take the test within 4 days of your surgery or procedure. We can't accept tests older than 4 days.  3. Make sure your doctor or clinic faxes your results to [facility] at [phone].  If we don't get your results in time, we may have to postpone or cancel your treatment.  Ask if you're getting a \"RT-PCR\" COVID-19 test. It should NOT be a \"rapid\" COVID-19 test. Many drug stores use \"rapid\" tests, but they may not be as accurate. We don't accept the results of \"rapid\" tests.  For informational purposes only. Not to replace the advice of your health care provider. Copyright   2020 [facility] [employer]. All rights reserved. Clinically reviewed by Infection Prevention and the [facility] COVID-19 Clinical Team. [employer] [id number] - REV 10/20.    How to Take Your Medication Before Surgery  - STOP " taking all vitamins and herbal supplements 5 days before surgery.  - STOP ibuprofen, advil, motrin 5 days prior to surgery

## 2021-07-17 LAB — SARS-COV-2 RNA RESP QL NAA+PROBE: NEGATIVE

## 2021-07-19 ENCOUNTER — HOSPITAL ENCOUNTER (OUTPATIENT)
Facility: AMBULATORY SURGERY CENTER | Age: 51
End: 2021-07-19
Attending: OBSTETRICS & GYNECOLOGY
Payer: COMMERCIAL

## 2021-07-19 ENCOUNTER — ANESTHESIA (OUTPATIENT)
Dept: SURGERY | Facility: AMBULATORY SURGERY CENTER | Age: 51
End: 2021-07-19
Payer: COMMERCIAL

## 2021-07-19 VITALS
HEART RATE: 62 BPM | HEIGHT: 66 IN | WEIGHT: 158 LBS | SYSTOLIC BLOOD PRESSURE: 123 MMHG | DIASTOLIC BLOOD PRESSURE: 77 MMHG | TEMPERATURE: 97.4 F | BODY MASS INDEX: 25.39 KG/M2 | OXYGEN SATURATION: 97 % | RESPIRATION RATE: 16 BRPM

## 2021-07-19 DIAGNOSIS — R93.89 THICKENED ENDOMETRIUM: ICD-10-CM

## 2021-07-19 DIAGNOSIS — N93.9 ABNORMAL UTERINE BLEEDING (AUB): ICD-10-CM

## 2021-07-19 LAB
HCG UR QL: NEGATIVE
HCV AB SERPL QL IA: NEGATIVE
INTERNAL QC OK POCT: NORMAL

## 2021-07-19 RX ORDER — FENTANYL CITRATE 50 UG/ML
INJECTION, SOLUTION INTRAMUSCULAR; INTRAVENOUS PRN
Status: DISCONTINUED | OUTPATIENT
Start: 2021-07-19 | End: 2021-07-19

## 2021-07-19 RX ORDER — ONDANSETRON 2 MG/ML
4 INJECTION INTRAMUSCULAR; INTRAVENOUS EVERY 30 MIN PRN
Status: DISCONTINUED | OUTPATIENT
Start: 2021-07-19 | End: 2021-07-20 | Stop reason: HOSPADM

## 2021-07-19 RX ORDER — ONDANSETRON 2 MG/ML
INJECTION INTRAMUSCULAR; INTRAVENOUS PRN
Status: DISCONTINUED | OUTPATIENT
Start: 2021-07-19 | End: 2021-07-19

## 2021-07-19 RX ORDER — LIDOCAINE HYDROCHLORIDE 20 MG/ML
INJECTION, SOLUTION INFILTRATION; PERINEURAL PRN
Status: DISCONTINUED | OUTPATIENT
Start: 2021-07-19 | End: 2021-07-19

## 2021-07-19 RX ORDER — ACETAMINOPHEN 325 MG/1
975 TABLET ORAL ONCE
Status: DISCONTINUED | OUTPATIENT
Start: 2021-07-19 | End: 2021-07-20 | Stop reason: HOSPADM

## 2021-07-19 RX ORDER — PROPOFOL 10 MG/ML
INJECTION, EMULSION INTRAVENOUS PRN
Status: DISCONTINUED | OUTPATIENT
Start: 2021-07-19 | End: 2021-07-19

## 2021-07-19 RX ORDER — KETOROLAC TROMETHAMINE 15 MG/ML
INJECTION, SOLUTION INTRAMUSCULAR; INTRAVENOUS PRN
Status: DISCONTINUED | OUTPATIENT
Start: 2021-07-19 | End: 2021-07-19

## 2021-07-19 RX ORDER — MEPERIDINE HYDROCHLORIDE 25 MG/ML
12.5 INJECTION INTRAMUSCULAR; INTRAVENOUS; SUBCUTANEOUS
Status: DISCONTINUED | OUTPATIENT
Start: 2021-07-19 | End: 2021-07-20 | Stop reason: HOSPADM

## 2021-07-19 RX ORDER — ACETAMINOPHEN 325 MG/1
975 TABLET ORAL ONCE
Status: COMPLETED | OUTPATIENT
Start: 2021-07-19 | End: 2021-07-19

## 2021-07-19 RX ORDER — ONDANSETRON 4 MG/1
4 TABLET, ORALLY DISINTEGRATING ORAL EVERY 30 MIN PRN
Status: DISCONTINUED | OUTPATIENT
Start: 2021-07-19 | End: 2021-07-20 | Stop reason: HOSPADM

## 2021-07-19 RX ORDER — PROPOFOL 10 MG/ML
INJECTION, EMULSION INTRAVENOUS CONTINUOUS PRN
Status: DISCONTINUED | OUTPATIENT
Start: 2021-07-19 | End: 2021-07-19

## 2021-07-19 RX ORDER — LIDOCAINE HYDROCHLORIDE 10 MG/ML
INJECTION, SOLUTION EPIDURAL; INFILTRATION; INTRACAUDAL; PERINEURAL PRN
Status: DISCONTINUED | OUTPATIENT
Start: 2021-07-19 | End: 2021-07-19 | Stop reason: HOSPADM

## 2021-07-19 RX ORDER — SODIUM CHLORIDE, SODIUM LACTATE, POTASSIUM CHLORIDE, CALCIUM CHLORIDE 600; 310; 30; 20 MG/100ML; MG/100ML; MG/100ML; MG/100ML
INJECTION, SOLUTION INTRAVENOUS CONTINUOUS
Status: DISCONTINUED | OUTPATIENT
Start: 2021-07-19 | End: 2021-07-20 | Stop reason: HOSPADM

## 2021-07-19 RX ORDER — DEXAMETHASONE SODIUM PHOSPHATE 4 MG/ML
INJECTION, SOLUTION INTRA-ARTICULAR; INTRALESIONAL; INTRAMUSCULAR; INTRAVENOUS; SOFT TISSUE PRN
Status: DISCONTINUED | OUTPATIENT
Start: 2021-07-19 | End: 2021-07-19

## 2021-07-19 RX ADMIN — LIDOCAINE HYDROCHLORIDE 40 MG: 20 INJECTION, SOLUTION INFILTRATION; PERINEURAL at 08:37

## 2021-07-19 RX ADMIN — PROPOFOL 140 MCG/KG/MIN: 10 INJECTION, EMULSION INTRAVENOUS at 08:37

## 2021-07-19 RX ADMIN — ACETAMINOPHEN 975 MG: 325 TABLET ORAL at 08:11

## 2021-07-19 RX ADMIN — KETOROLAC TROMETHAMINE 15 MG: 15 INJECTION, SOLUTION INTRAMUSCULAR; INTRAVENOUS at 08:58

## 2021-07-19 RX ADMIN — DEXAMETHASONE SODIUM PHOSPHATE 4 MG: 4 INJECTION, SOLUTION INTRA-ARTICULAR; INTRALESIONAL; INTRAMUSCULAR; INTRAVENOUS; SOFT TISSUE at 08:42

## 2021-07-19 RX ADMIN — SODIUM CHLORIDE, SODIUM LACTATE, POTASSIUM CHLORIDE, CALCIUM CHLORIDE: 600; 310; 30; 20 INJECTION, SOLUTION INTRAVENOUS at 08:18

## 2021-07-19 RX ADMIN — ONDANSETRON 4 MG: 2 INJECTION INTRAMUSCULAR; INTRAVENOUS at 08:55

## 2021-07-19 RX ADMIN — FENTANYL CITRATE 100 MCG: 50 INJECTION, SOLUTION INTRAMUSCULAR; INTRAVENOUS at 08:37

## 2021-07-19 RX ADMIN — PROPOFOL 30 MG: 10 INJECTION, EMULSION INTRAVENOUS at 08:37

## 2021-07-19 ASSESSMENT — COPD QUESTIONNAIRES: COPD: 0

## 2021-07-19 ASSESSMENT — ENCOUNTER SYMPTOMS: SEIZURES: 0

## 2021-07-19 ASSESSMENT — LIFESTYLE VARIABLES: TOBACCO_USE: 1

## 2021-07-19 NOTE — ANESTHESIA CARE TRANSFER NOTE
Patient: Effie Olivo    Procedure(s):  HYSTEROSCOPY, WITH DILATION AND CURETTAGE, POSSIBLE POLYPECTOMY    Diagnosis: * No pre-op diagnosis entered *  Diagnosis Additional Information: No value filed.    Anesthesia Type:   MAC     Note:    Oropharynx: oropharynx clear of all foreign objects  Level of Consciousness: drowsy  Oxygen Supplementation: room air    Independent Airway: airway patency satisfactory and stable  Dentition: dentition unchanged  Vital Signs Stable: post-procedure vital signs reviewed and stable  Report to RN Given: handoff report given  Patient transferred to: Phase II    Handoff Report: Identifed the Patient, Identified the Reponsible Provider, Reviewed the pertinent medical history, Discussed the surgical course, Reviewed Intra-OP anesthesia mangement and issues during anesthesia, Set expectations for post-procedure period and Allowed opportunity for questions and acknowledgement of understanding      Vitals: (Last set prior to Anesthesia Care Transfer)  CRNA VITALS  7/19/2021 0839 - 7/19/2021 0912      7/19/2021             Pulse:  70    Ht Rate:  70    SpO2:  98 %    Resp Rate (observed):  15        Electronically Signed By: ARGENIS WILL CRNA  July 19, 2021  9:12 AM

## 2021-07-19 NOTE — ANESTHESIA POSTPROCEDURE EVALUATION
Patient: Effie Olivo    Procedure(s):  HYSTEROSCOPY, WITH DILATION AND CURETTAGE, POSSIBLE POLYPECTOMY    Diagnosis:* No pre-op diagnosis entered *  Diagnosis Additional Information: No value filed.    Anesthesia Type:  MAC    Note:  Disposition: Outpatient   Postop Pain Control: Uneventful            Sign Out: Well controlled pain   PONV: No   Neuro/Psych:    Airway/Respiratory: Uneventful            Sign Out: Acceptable/Baseline resp. status   CV/Hemodynamics: Uneventful            Sign Out: Acceptable CV status; No obvious hypovolemia; No obvious fluid overload   Other NRE: NONE   DID A NON-ROUTINE EVENT OCCUR? No           Last vitals:  Vitals:    07/19/21 0800 07/19/21 0911   BP: (!) 147/79 111/72   Pulse: 79    Resp:  16   Temp: 97.9  F (36.6  C) 97.4  F (36.3  C)   SpO2: 99% 94%       Last vitals prior to Anesthesia Care Transfer:  CRNA VITALS  7/19/2021 0839 - 7/19/2021 0922      7/19/2021             Pulse:  70    Ht Rate:  70    SpO2:  98 %    Resp Rate (observed):  15          Electronically Signed By: Martha Sandoval MD  July 19, 2021  9:22 AM

## 2021-07-19 NOTE — ANESTHESIA PREPROCEDURE EVALUATION
Anesthesia Pre-Procedure Evaluation    Patient: Effie Olivo   MRN: [id number] : 1970        Preoperative Diagnosis: * No pre-op diagnosis entered *   Procedure : Procedure(s):  HYSTEROSCOPY, WITH DILATION AND CURETTAGE, POSSIBLE POLYPECTOMY     Past Medical History:   Diagnosis Date     Anxiety      Chronic peptic ulcer, unspecified site, without mention of hemorrhage, perforation, or obstruction     Saw GI who did not want to scope--thought due to ibuprofen     HTN (hypertension)      MEDICAL HISTORY OF -     back injury     MEDICAL HISTORY OF -     congenital fusion of L5-S1     Motion sickness      Other specified anemias     Hgb 10  in 2005     Palpitations       Past Surgical History:   Procedure Laterality Date     C BREAST SURGERY PROCEDURE UNLISTED      Breast reduction     C  DELIVERY ONLY      , Low Cervical      SECTION       MAMMOPLASTY REDUCTION Bilateral       Allergies   Allergen Reactions     Percocet [Oxycodone-Acetaminophen] Nausea and Vomiting     Azithromycin Diarrhea     Hydrocodone Other (See Comments)     Feels Highty Anxious      Morphine Hcl Nausea and Vomiting and Hives      Social History     Tobacco Use     Smoking status: Former Smoker     Types: Cigars     Smokeless tobacco: Never Used     Tobacco comment: hasa cigar about once per month   Substance Use Topics     Alcohol use: Yes     Comment: Alcoholic Drinks/day: occasional      Wt Readings from Last 1 Encounters:   07/15/21 71.7 kg (158 lb)        Anesthesia Evaluation   Pt has had prior anesthetic. Type: MAC.    No history of anesthetic complications       ROS/MED HX  ENT/Pulmonary:     (+) tobacco use, Past use,  (-) asthma, COPD, sleep apnea and NAE risk factors   Neurologic:  - neg neurologic ROS  (-) no seizures, no CVA, no TIA and no Multiple Sclerosis   Cardiovascular:  - neg cardiovascular ROS  (-) hypertension   METS/Exercise Tolerance: >4 METS Comment: Runs 2 miles qod    Hematologic:  - neg hematologic  ROS     Musculoskeletal:  - neg musculoskeletal ROS     GI/Hepatic:  - neg GI/hepatic ROS  (-) GERD   Renal/Genitourinary:  - neg Renal ROS     Endo: Comment: overweight   (-) obesity   Psychiatric/Substance Use:       Infectious Disease:  - neg infectious disease ROS     Malignancy:       Other: Comment: UPT neg - neg other ROS          Physical Exam    Airway        Mallampati: II   TM distance: > 3 FB   Neck ROM: full     Respiratory Devices and Support         Dental  no notable dental history         Cardiovascular   cardiovascular exam normal       Rhythm and rate: irregular and normal     Pulmonary   pulmonary exam normal        breath sounds clear to auscultation           OUTSIDE LABS:  CBC:   Lab Results   Component Value Date    WBC 5.7 07/16/2021    WBC 7.2 03/17/2021    HGB 12.5 07/16/2021    HGB 12.6 03/17/2021    HCT 39.3 07/16/2021    HCT 38.9 03/17/2021     07/16/2021     03/17/2021     BMP:   Lab Results   Component Value Date     07/16/2021     03/17/2021    POTASSIUM 4.0 07/16/2021    POTASSIUM 4.1 03/17/2021    CHLORIDE 104 07/16/2021    CHLORIDE 105 03/17/2021    CO2 24 07/16/2021    CO2 27 03/17/2021    BUN 13 07/16/2021    BUN 16 03/17/2021    CR 0.77 07/16/2021    CR 0.78 03/17/2021    GLC 94 07/16/2021    GLC 95 03/17/2021     COAGS: No results found for: PTT, INR, FIBR  POC:   Lab Results   Component Value Date    HCG Negative 07/16/2021     HEPATIC:   Lab Results   Component Value Date    ALBUMIN 4.0 07/16/2021    PROTTOTAL 6.6 07/16/2021    ALT 11 07/16/2021    AST 14 07/16/2021    ALKPHOS 64 07/16/2021    BILITOTAL 0.8 07/16/2021     OTHER:   Lab Results   Component Value Date    YULI 9.1 07/16/2021    TSH 2.21 03/17/2021       Anesthesia Plan    ASA Status:  2      Anesthesia Type: MAC.              Consents         - Extended Intubation/Ventilatory Support Discussed: No.      - Patient is DNR/DNI Status: No    Use of blood  products discussed: No .     Postoperative Care    Pain management: Oral pain medications.   PONV prophylaxis: Ondansetron (or other 5HT-3), Dexamethasone or Solumedrol     Comments:                Martha Sandoval MD

## 2021-07-19 NOTE — OP NOTE
PROCEDURE NOTE - HYSTEROSCOPY AND DILATION AND CURETTAGE     Name: Effie Olivo   : 1970   MRN: [id number]     DATE OF SERVICE:  2021     PREOPERATIVE DIAGNOSIS: uterine polyp    POSTOPERATIVE DIAGNOSIS: Dysfunctional uterine bleeding secondary to uterine polyp    PROCEDURES: Hysteroscopy, dilatation and curettage, polypectomy    SURGEON: Rossy Christy MD     ASSISTANT: OR staff    ANESTHESIA:  mac    ESTIMATED BLOOD LOSS:   5 cc    HISTORY OF PRESENT ILLNESS:  This is a 51 year old female with history of dysfunctional uterine bleeding thought to be secondary to uterine polyp. She had a transvaginal ultrasound prior to which showed a thickened lining.  She was given informed consent for the above procedure. The risks, benefits and alternatives were discussed with her including but not exclusive to uterine perforation, bleeding and infection. She expressed understanding and wished to proceed.     PROCEDURE:  The patient was brought to the operating room and after induction of MAC anesthesia was prepped and draped in the dorsal lithotomy position. A time out was called and the patient and the procedure were verified.    A sterile weighted speculum was then placed. The anterior lip of the cervix was grasped with a single tooth tenaculum. Uterine cavity was then sounded to 8 cm. The cervix was gently dilated and the hysteroscope was introduced without difficulty. The cavity of the uterus appeared normal except for a polyp in the posterior segment. This measured about 1 cm. The Myosure was used to remove the polyp. The hysteroscope was removed. At this point endometrial curettings were obtained by curettage. All quadrants were sampled, and the tissue was submitted for pathologic exam.  At this point good hemostasis was noted and the hysteroscope was removed once again. The tenaculum site required pressure and silver nitrate sticks to stop the bleeding. Instruments were removed from vagina. No active  bleeding was noted. Sponge and needle counts were correct X 2. She was taken to recovery in stable condition.    Rossy Christy M.D.

## 2021-07-19 NOTE — DISCHARGE INSTRUCTIONS
You received a medication called Toradol (a stronger IV ibuprofen) at 0900am. Do NOT take any Ibuprofen/Advil/Aleve/Naproxen or products containing Ibuprofen until 3pm or later.    You received Tylenol(acetaminophen) 975 mg at 810 am. Next  Dose due after 2pm. You may take 2 extra strength Tylenol very 6 hours. Do not exceed 4000mg in a day.     Call Dr. Christy if you are soaking more than a pad a hour over 2 hours. Spotting is normal. Discharge may vary in color from tan to pink, to red.      Discharge Instructions: After Your Surgery  You ve just had surgery. During surgery, you were given medicine called anesthesia to keep you relaxed and free of pain. After surgery, you may have some pain or nausea. This is common. Here are some tips for feeling better and getting well after surgery.  Going home  Your healthcare provider will show you how to take care of yourself when you go home. He or she will also answer your questions. Have an adult family member or friend drive you home. For the first 24 hours after your surgery:    Don't drive or use heavy equipment.    Don't make important decisions or sign legal papers.    Don't drink alcohol.    Have someone stay with you. He or she can watch for problems and help keep you safe.  Be sure to go to all follow-up visits with your healthcare provider. And rest after your surgery for as long as your healthcare provider tells you to.  Coping with pain  If you have pain after surgery, pain medicine will help you feel better. Take it as told, before pain becomes severe. Also, ask your healthcare provider or pharmacist about other ways to control pain. This might be with heat, ice, or relaxation. And follow any other instructions your surgeon or nurse gives you.  Tips for taking pain medicine  To get the best relief possible, remember these points:    Pain medicines can upset your stomach. Taking them with a little food may help.    Most pain relievers taken by mouth need at least  20 to 30 minutes to start to work.    Don't wait till your pain becomes severe before you take your medicine. Try to time your medicine so that you can take it before starting an activity. This might be before you get dressed, go for a walk, or sit down for dinner.    Constipation is a common side effect of pain medicines. You may take laxatives or stool softeners to help ease constipation. Drinking lots of fluids and eating foods such as fruits and vegetables that are high in fiber can also help.     Drinking alcohol and taking pain medicine can cause dizziness and slow your breathing. It can even be deadly. Don't drink alcohol while taking pain medicine.    Pain medicine can make you react more slowly to things. Don't drive or run machinery while taking pain medicine.  Your healthcare provider may tell you to take acetaminophen to help ease your pain. Ask him or her how much you are supposed to take each day. Acetaminophen or other pain relievers may interact with your prescription medicines or other over-the-counter (OTC) medicines. Some prescription medicines have acetaminophen and other ingredients. Using both prescription and OTC acetaminophen for pain can cause you to overdose. Read the labels on your OTC medicines with care. This will help you to clearly know the list of ingredients, how much to take, and any warnings. It may also help you not take too much acetaminophen. If you have questions or don't understand the information, ask your pharmacist or healthcare provider to explain it to you before you take the OTC medicine.  Managing nausea  Some people have an upset stomach after surgery. This is often because of anesthesia, pain, or pain medicine, or the stress of surgery. These tips will help you handle nausea and eat healthy foods as you get better. If you were on a special food plan before surgery, ask your healthcare provider if you should follow it while you get better. These tips may help:    Don't  push yourself to eat. Your body will tell you when to eat and how much.    Start off with clear liquids and soup. They are easier to digest.    Next try semi-solid foods, such as mashed potatoes, applesauce, and gelatin, as you feel ready.    Slowly move to solid foods. Don t eat fatty, rich, or spicy foods at first.    Don't force yourself to have 3 large meals a day. Instead eat smaller amounts more often.    Take pain medicines with a small amount of solid food, such as crackers or toast, to prevent nausea.  When to call your healthcare provider  Call your healthcare provider if:    You still have intolerable pain an hour after taking medicine. The medicine may not be strong enough.    You feel too sleepy, dizzy, or groggy. The medicine may be too strong.    You have side effects such as nausea or vomiting, or skin changes such as rash, itching, or hives. Your healthcare provider may suggest other medicines to control side effects.  Rash, itching, or hives may mean you have an allergic reaction. Report this right away. If you have trouble breathing or facial swelling, call 911 right away.  If you have obstructive sleep apnea  You were given anesthesia medicine during surgery to keep you comfortable and free of pain. After surgery, you may have more apnea spells because of this medicine and other medicines you were given. The spells may last longer than usual.   At home:    Keep using the continuous positive airway pressure (CPAP) device when you sleep. Unless your healthcare provider tells you not to, use it when you sleep, day or night. CPAP is a common device used to treat obstructive sleep apnea.    Talk with your provider before taking any pain medicine, muscle relaxants, or sedatives. Your provider will tell you about the possible dangers of taking these medicines.  [employer] last reviewed this educational content on 3/1/2019    0425-0789 [employer]. All rights reserved. This information is not  intended as a substitute for professional medical care. Always follow your healthcare professional's instructions.

## 2021-07-19 NOTE — H&P
H&P Update    I reviewed the entire surgery with the patient. Concerns and questions addressed.     Rossy Christy M.D.

## 2021-10-10 ENCOUNTER — HEALTH MAINTENANCE LETTER (OUTPATIENT)
Age: 51
End: 2021-10-10

## 2021-11-11 ENCOUNTER — IMMUNIZATION (OUTPATIENT)
Dept: FAMILY MEDICINE | Facility: CLINIC | Age: 51
End: 2021-11-11
Payer: COMMERCIAL

## 2021-11-11 PROCEDURE — 90682 RIV4 VACC RECOMBINANT DNA IM: CPT

## 2021-11-11 PROCEDURE — 90471 IMMUNIZATION ADMIN: CPT

## 2022-01-25 DIAGNOSIS — Z00.00 ROUTINE GENERAL MEDICAL EXAMINATION AT A HEALTH CARE FACILITY: Primary | ICD-10-CM

## 2022-01-25 DIAGNOSIS — F40.243 FEAR OF FLYING: ICD-10-CM

## 2022-01-27 RX ORDER — LORAZEPAM 0.5 MG/1
TABLET ORAL
Qty: 20 TABLET | Refills: 0 | Status: SHIPPED | OUTPATIENT
Start: 2022-01-27 | End: 2023-05-17

## 2022-01-27 NOTE — TELEPHONE ENCOUNTER
Routing refill request to provider for review/approval because:  Controlled substance request    Last office visit provider:  3/17/21     Requested Prescriptions   Pending Prescriptions Disp Refills     LORazepam (ATIVAN) 0.5 MG tablet [Pharmacy Med Name: LORAZEPAM 0.5MG TABLETS] 20 tablet      Sig: TAKE 1 TABLET(0.5 MG) BY MOUTH EVERY 8 HOURS AS NEEDED FOR ANXIETY       There is no refill protocol information for this order          jonathan van RN 01/26/22 8:19 PM

## 2022-05-22 ENCOUNTER — HEALTH MAINTENANCE LETTER (OUTPATIENT)
Age: 52
End: 2022-05-22

## 2022-09-24 ENCOUNTER — HEALTH MAINTENANCE LETTER (OUTPATIENT)
Age: 52
End: 2022-09-24

## 2023-05-17 ENCOUNTER — NURSE TRIAGE (OUTPATIENT)
Dept: NURSING | Facility: CLINIC | Age: 53
End: 2023-05-17
Payer: COMMERCIAL

## 2023-05-17 DIAGNOSIS — F40.243 FEAR OF FLYING: ICD-10-CM

## 2023-05-17 NOTE — TELEPHONE ENCOUNTER
Attempted to call patient, left message for return call to clinic. Please see provider notation below when patient returns call.    Lala Haynes RN

## 2023-05-17 NOTE — TELEPHONE ENCOUNTER
It looks like is been 2 years since I have seen her so she is due for complete physical.  We have very limited immunization information on her.  I do not know if she has records at home she can bring.  Sometimes we can do titers depending on what is needed.  I would recommend scheduling an appointment.

## 2023-05-17 NOTE — TELEPHONE ENCOUNTER
Patient states she is flying tomorrow and has used the medication in the past for that event. Patient only asking for enough of this medication to last for the duration of the trip.    Dani Godoy, RN, BSN, MSN  [facility]NA Triage 12:34 PM

## 2023-05-18 RX ORDER — LORAZEPAM 0.5 MG/1
0.5 TABLET ORAL EVERY 8 HOURS PRN
Qty: 5 TABLET | Refills: 0 | Status: SHIPPED | OUTPATIENT
Start: 2023-05-18 | End: 2023-11-27

## 2023-06-04 ENCOUNTER — HEALTH MAINTENANCE LETTER (OUTPATIENT)
Age: 53
End: 2023-06-04

## 2023-10-14 ENCOUNTER — HEALTH MAINTENANCE LETTER (OUTPATIENT)
Age: 53
End: 2023-10-14

## 2023-11-27 DIAGNOSIS — F40.243 FEAR OF FLYING: ICD-10-CM

## 2023-11-27 RX ORDER — LORAZEPAM 0.5 MG/1
0.5 TABLET ORAL EVERY 8 HOURS PRN
Qty: 5 TABLET | Refills: 0 | Status: SHIPPED | OUTPATIENT
Start: 2023-11-27

## 2024-07-14 ENCOUNTER — HEALTH MAINTENANCE LETTER (OUTPATIENT)
Age: 54
End: 2024-07-14